# Patient Record
Sex: FEMALE | Race: WHITE | Employment: UNEMPLOYED | URBAN - METROPOLITAN AREA
[De-identification: names, ages, dates, MRNs, and addresses within clinical notes are randomized per-mention and may not be internally consistent; named-entity substitution may affect disease eponyms.]

---

## 2017-04-21 ENCOUNTER — HOSPITAL ENCOUNTER (EMERGENCY)
Age: 57
Discharge: HOME | End: 2017-04-21
Attending: EMERGENCY MEDICINE
Payer: COMMERCIAL

## 2017-04-21 ENCOUNTER — APPOINTMENT (OUTPATIENT)
Dept: CT IMAGING | Age: 57
End: 2017-04-21
Attending: EMERGENCY MEDICINE
Payer: COMMERCIAL

## 2017-04-21 VITALS
SYSTOLIC BLOOD PRESSURE: 111 MMHG | BODY MASS INDEX: 25.16 KG/M2 | TEMPERATURE: 98.3 F | HEIGHT: 63 IN | HEART RATE: 87 BPM | DIASTOLIC BLOOD PRESSURE: 76 MMHG | OXYGEN SATURATION: 96 % | WEIGHT: 142 LBS | RESPIRATION RATE: 16 BRPM

## 2017-04-21 DIAGNOSIS — N30.00 ACUTE CYSTITIS WITHOUT HEMATURIA: Primary | ICD-10-CM

## 2017-04-21 LAB
ALBUMIN SERPL BCP-MCNC: 4 G/DL (ref 3.5–5)
ALBUMIN/GLOB SERPL: 1.1 {RATIO} (ref 1.2–3.5)
ALP SERPL-CCNC: 118 U/L (ref 50–136)
ALT SERPL-CCNC: 22 U/L (ref 12–65)
ANION GAP BLD CALC-SCNC: 8 MMOL/L (ref 7–16)
AST SERPL W P-5'-P-CCNC: 21 U/L (ref 15–37)
BACTERIA URNS QL MICRO: ABNORMAL /HPF
BASOPHILS # BLD AUTO: 0 K/UL (ref 0–0.2)
BASOPHILS # BLD: 0 % (ref 0–2)
BILIRUB SERPL-MCNC: 0.4 MG/DL (ref 0.2–1.1)
BUN SERPL-MCNC: 14 MG/DL (ref 6–23)
CALCIUM SERPL-MCNC: 9.4 MG/DL (ref 8.3–10.4)
CASTS URNS QL MICRO: 0 /LPF
CHLORIDE SERPL-SCNC: 110 MMOL/L (ref 98–107)
CO2 SERPL-SCNC: 25 MMOL/L (ref 21–32)
CREAT SERPL-MCNC: 0.79 MG/DL (ref 0.6–1)
CRYSTALS URNS QL MICRO: 0 /LPF
DIFFERENTIAL METHOD BLD: ABNORMAL
EOSINOPHIL # BLD: 0.1 K/UL (ref 0–0.8)
EOSINOPHIL NFR BLD: 1 % (ref 0.5–7.8)
EPI CELLS #/AREA URNS HPF: ABNORMAL /HPF
ERYTHROCYTE [DISTWIDTH] IN BLOOD BY AUTOMATED COUNT: 15 % (ref 11.9–14.6)
GLOBULIN SER CALC-MCNC: 3.7 G/DL (ref 2.3–3.5)
GLUCOSE SERPL-MCNC: 118 MG/DL (ref 65–100)
HCT VFR BLD AUTO: 36.6 % (ref 35.8–46.3)
HGB BLD-MCNC: 11.7 G/DL (ref 11.7–15.4)
IMM GRANULOCYTES # BLD: 0 K/UL (ref 0–0.5)
IMM GRANULOCYTES NFR BLD AUTO: 0.2 % (ref 0–5)
LYMPHOCYTES # BLD AUTO: 6 % (ref 13–44)
LYMPHOCYTES # BLD: 0.6 K/UL (ref 0.5–4.6)
MCH RBC QN AUTO: 27.2 PG (ref 26.1–32.9)
MCHC RBC AUTO-ENTMCNC: 32 G/DL (ref 31.4–35)
MCV RBC AUTO: 85.1 FL (ref 79.6–97.8)
MONOCYTES # BLD: 0.2 K/UL (ref 0.1–1.3)
MONOCYTES NFR BLD AUTO: 2 % (ref 4–12)
MUCOUS THREADS URNS QL MICRO: 0 /LPF
NEUTS SEG # BLD: 8.7 K/UL (ref 1.7–8.2)
NEUTS SEG NFR BLD AUTO: 91 % (ref 43–78)
OTHER OBSERVATIONS,UCOM: ABNORMAL
PLATELET # BLD AUTO: 332 K/UL (ref 150–450)
PMV BLD AUTO: 9.3 FL (ref 10.8–14.1)
POTASSIUM SERPL-SCNC: 4.3 MMOL/L (ref 3.5–5.1)
PROT SERPL-MCNC: 7.7 G/DL (ref 6.3–8.2)
RBC # BLD AUTO: 4.3 M/UL (ref 4.05–5.25)
RBC #/AREA URNS HPF: ABNORMAL /HPF
SODIUM SERPL-SCNC: 143 MMOL/L (ref 136–145)
WBC # BLD AUTO: 9.6 K/UL (ref 4.3–11.1)
WBC URNS QL MICRO: ABNORMAL /HPF

## 2017-04-21 PROCEDURE — 85025 COMPLETE CBC W/AUTO DIFF WBC: CPT | Performed by: EMERGENCY MEDICINE

## 2017-04-21 PROCEDURE — 99284 EMERGENCY DEPT VISIT MOD MDM: CPT | Performed by: EMERGENCY MEDICINE

## 2017-04-21 PROCEDURE — 81015 MICROSCOPIC EXAM OF URINE: CPT | Performed by: EMERGENCY MEDICINE

## 2017-04-21 PROCEDURE — 80053 COMPREHEN METABOLIC PANEL: CPT | Performed by: EMERGENCY MEDICINE

## 2017-04-21 PROCEDURE — 81003 URINALYSIS AUTO W/O SCOPE: CPT | Performed by: EMERGENCY MEDICINE

## 2017-04-21 PROCEDURE — 70450 CT HEAD/BRAIN W/O DYE: CPT

## 2017-04-21 RX ORDER — CEFPODOXIME PROXETIL 100 MG/1
100 TABLET, FILM COATED ORAL 2 TIMES DAILY
Qty: 14 TAB | Refills: 0 | Status: SHIPPED | OUTPATIENT
Start: 2017-04-21 | End: 2017-04-28
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
## 2017-04-21 NOTE — DISCHARGE INSTRUCTIONS
Urinary Tract Infection in Women: Care Instructions  Your Care Instructions    A urinary tract infection, or UTI, is a general term for an infection anywhere between the kidneys and the urethra (where urine comes out). Most UTIs are bladder infections. They often cause pain or burning when you urinate. UTIs are caused by bacteria and can be cured with antibiotics. Be sure to complete your treatment so that the infection goes away. Follow-up care is a key part of your treatment and safety. Be sure to make and go to all appointments, and call your doctor if you are having problems. It's also a good idea to know your test results and keep a list of the medicines you take. How can you care for yourself at home? · Take your antibiotics as directed. Do not stop taking them just because you feel better. You need to take the full course of antibiotics. · Drink extra water and other fluids for the next day or two. This may help wash out the bacteria that are causing the infection. (If you have kidney, heart, or liver disease and have to limit fluids, talk with your doctor before you increase your fluid intake.)  · Avoid drinks that are carbonated or have caffeine. They can irritate the bladder. · Urinate often. Try to empty your bladder each time. · To relieve pain, take a hot bath or lay a heating pad set on low over your lower belly or genital area. Never go to sleep with a heating pad in place. To prevent UTIs  · Drink plenty of water each day. This helps you urinate often, which clears bacteria from your system. (If you have kidney, heart, or liver disease and have to limit fluids, talk with your doctor before you increase your fluid intake.)  · Urinate when you need to. · Urinate right after you have sex. · Change sanitary pads often. · Avoid douches, bubble baths, feminine hygiene sprays, and other feminine hygiene products that have deodorants.   · After going to the bathroom, wipe from front to back.  When should you call for help? Call your doctor now or seek immediate medical care if:  · Symptoms such as fever, chills, nausea, or vomiting get worse or appear for the first time. · You have new pain in your back just below your rib cage. This is called flank pain. · There is new blood or pus in your urine. · You have any problems with your antibiotic medicine. Watch closely for changes in your health, and be sure to contact your doctor if:  · You are not getting better after taking an antibiotic for 2 days. · Your symptoms go away but then come back. Where can you learn more? Go to http://arsalan-tyrell.info/. Enter V449 in the search box to learn more about \"Urinary Tract Infection in Women: Care Instructions. \"  Current as of: November 28, 2016  Content Version: 11.2  © 6274-4251 [employer]. Care instructions adapted under license by [employer] (which disclaims liability or warranty for this information). If you have questions about a medical condition or this instruction, always ask your healthcare professional. [place] [address] any warranty or liability for your use of this information. Urinary Tract Infection in Women: Care Instructions  Your Care Instructions    A urinary tract infection, or UTI, is a general term for an infection anywhere between the kidneys and the urethra (where urine comes out). Most UTIs are bladder infections. They often cause pain or burning when you urinate. UTIs are caused by bacteria and can be cured with antibiotics. Be sure to complete your treatment so that the infection goes away. Follow-up care is a key part of your treatment and safety. Be sure to make and go to all appointments, and call your doctor if you are having problems. It's also a good idea to know your test results and keep a list of the medicines you take. How can you care for yourself at home?   · Take your antibiotics as directed. Do not stop taking them just because you feel better. You need to take the full course of antibiotics. · Drink extra water and other fluids for the next day or two. This may help wash out the bacteria that are causing the infection. (If you have kidney, heart, or liver disease and have to limit fluids, talk with your doctor before you increase your fluid intake.)  · Avoid drinks that are carbonated or have caffeine. They can irritate the bladder. · Urinate often. Try to empty your bladder each time. · To relieve pain, take a hot bath or lay a heating pad set on low over your lower belly or genital area. Never go to sleep with a heating pad in place. To prevent UTIs  · Drink plenty of water each day. This helps you urinate often, which clears bacteria from your system. (If you have kidney, heart, or liver disease and have to limit fluids, talk with your doctor before you increase your fluid intake.)  · Urinate when you need to. · Urinate right after you have sex. · Change sanitary pads often. · Avoid douches, bubble baths, feminine hygiene sprays, and other feminine hygiene products that have deodorants. · After going to the bathroom, wipe from front to back. When should you call for help? Call your doctor now or seek immediate medical care if:  · Symptoms such as fever, chills, nausea, or vomiting get worse or appear for the first time. · You have new pain in your back just below your rib cage. This is called flank pain. · There is new blood or pus in your urine. · You have any problems with your antibiotic medicine. Watch closely for changes in your health, and be sure to contact your doctor if:  · You are not getting better after taking an antibiotic for 2 days. · Your symptoms go away but then come back. Where can you learn more? Go to http://arsalan-tyrell.info/.   Enter A830 in the search box to learn more about \"Urinary Tract Infection in Women: Care Instructions. \"  Current as of: November 28, 2016  Content Version: 11.2  © 2804-9702 [employer], [facility]. Care instructions adapted under license by [facility] (which disclaims liability or warranty for this information). If you have questions about a medical condition or this instruction, always ask your healthcare professional. [place] [address] any warranty or liability for your use of this information.

## 2017-04-21 NOTE — ED PROVIDER NOTES
HPI Comments: Here with some slight AMS at home but now is at her normal baseline. No known fever. No cough or sputum. No GI changes. some baseline depressive changes. No n-v-d. No med change. Daughter(hearing) states had episode in past like this when had UTI to her recall. Patient is a 55 y.o. female presenting with altered mental status. The history is provided by the patient and a relative (hearing and deaf daughters). A  was used (). Altered mental status    This is a new problem. The problem has been resolved. Associated symptoms include confusion. Pertinent negatives include no somnolence, no seizures, no unresponsiveness, no weakness, no agitation, no delusions and no hallucinations. Mental status baseline is normal.  Her past medical history does not include seizures, CVA, hypertension, depression, dementia or heart disease. Past Medical History:   Diagnosis Date    Anxiety     Asthma     controlled with inhalers    Deaf     Deafness since age 4    ASL-    Depression     Hypercholesterolemia     Hypertension     Hypothyroid     Stool color black     diarrhea    Vision problems        Past Surgical History:   Procedure Laterality Date    HX  SECTION      HX COLONOSCOPY      HX FRACTURE TX  5-6 yrs ago    right arm with pinning    HX ORTHOPAEDIC      R wrist with Hardware    HX ORTHOPAEDIC  14    R wrist surgery, she broke it and they put a metal plate    HX TUBAL LIGATION           Family History:   Problem Relation Age of Onset    Heart Disease Mother     Breast Cancer Mother 48    Heart Disease Father     Cancer Maternal Grandmother     Colon Cancer Neg Hx        Social History     Social History    Marital status: SINGLE     Spouse name: N/A    Number of children: N/A    Years of education: N/A     Occupational History    Not on file.      Social History Main Topics    Smoking status: Never Smoker    Smokeless tobacco: Never Used    Alcohol use No    Drug use: No    Sexual activity: No     Other Topics Concern    Not on file     Social History Narrative         ALLERGIES: Review of patient's allergies indicates no known allergies. Review of Systems   Constitutional: Negative for chills and fever. HENT: Negative. Respiratory: Negative. Cardiovascular: Negative. Gastrointestinal: Negative. Genitourinary: Negative. Neurological: Negative for seizures and weakness. Psychiatric/Behavioral: Positive for confusion. Negative for agitation and hallucinations. All other systems reviewed and are negative. Vitals:    04/21/17 1238   BP: 111/76   Pulse: 87   Resp: 16   Temp: 98.3 °F (36.8 °C)   SpO2: 96%   Weight: 64.4 kg (142 lb)   Height: 5' 3\" (1.6 m)            Physical Exam   Constitutional: She appears well-developed and well-nourished. No distress. Smiling and very pleasant   HENT:   Head: Atraumatic. Deaf     Eyes: EOM are normal. Pupils are equal, round, and reactive to light. No scleral icterus. Neck: Neck supple. Cardiovascular: Normal rate and intact distal pulses. Pulmonary/Chest: Effort normal. No respiratory distress. She has no wheezes. Abdominal: Soft. There is no tenderness. There is no rebound and no guarding. Musculoskeletal: Normal range of motion. Neurological: She is alert. No cranial nerve deficit. Skin: Skin is warm and dry. Psychiatric: Her behavior is normal. Thought content normal.   Nursing note and vitals reviewed. MDM  Number of Diagnoses or Management Options  Acute cystitis without hematuria:   Diagnosis management comments: AMS that is now resolved.  Has urine changes and will cover this (changed to Keflex due to cost)       Amount and/or Complexity of Data Reviewed  Clinical lab tests: reviewed and ordered  Tests in the radiology section of CPT®: reviewed  Obtain history from someone other than the patient: yes  Independent visualization of images, tracings, or specimens: yes    Risk of Complications, Morbidity, and/or Mortality  Presenting problems: high  Diagnostic procedures: low  Management options: moderate    Patient Progress  Patient progress: stable    ED Course       Procedures

## 2017-06-06 PROBLEM — E03.9 ACQUIRED HYPOTHYROIDISM: Chronic | Status: ACTIVE | Noted: 2017-06-06

## 2017-06-06 PROBLEM — I10 ESSENTIAL HYPERTENSION: Chronic | Status: ACTIVE | Noted: 2017-06-06

## 2017-06-10 ENCOUNTER — HOSPITAL ENCOUNTER (OUTPATIENT)
Dept: MAMMOGRAPHY | Age: 57
Discharge: HOME | End: 2017-06-10
Attending: FAMILY MEDICINE
Payer: COMMERCIAL

## 2017-06-10 DIAGNOSIS — Z12.31 SCREENING MAMMOGRAM, ENCOUNTER FOR: ICD-10-CM

## 2017-06-10 PROCEDURE — 77067 SCR MAMMO BI INCL CAD: CPT

## 2017-07-14 PROBLEM — N39.46 MIXED INCONTINENCE URGE AND STRESS: Status: ACTIVE | Noted: 2017-07-14

## 2017-07-14 PROBLEM — N95.2 ATROPHIC VAGINITIS: Status: ACTIVE | Noted: 2017-07-14

## 2017-07-18 PROBLEM — N39.0 URINARY TRACT INFECTION WITHOUT HEMATURIA: Status: ACTIVE | Noted: 2017-07-18

## 2018-09-19 ENCOUNTER — HOSPITAL ENCOUNTER (OUTPATIENT)
Dept: LAB | Age: 58
Discharge: HOME | End: 2018-09-19

## 2018-09-19 LAB
ANION GAP SERPL CALC-SCNC: 6 MMOL/L (ref 7–16)
APPEARANCE UR: ABNORMAL
BACTERIA URNS QL MICRO: ABNORMAL /HPF
BILIRUB UR QL: NEGATIVE
BUN SERPL-MCNC: 18 MG/DL (ref 6–23)
CALCIUM SERPL-MCNC: 8.6 MG/DL (ref 8.3–10.4)
CASTS URNS QL MICRO: ABNORMAL /LPF
CHLORIDE SERPL-SCNC: 105 MMOL/L (ref 98–107)
CO2 SERPL-SCNC: 27 MMOL/L (ref 21–32)
COLOR UR: YELLOW
CREAT SERPL-MCNC: 0.85 MG/DL (ref 0.6–1)
EPI CELLS #/AREA URNS HPF: 0 /HPF
ERYTHROCYTE [DISTWIDTH] IN BLOOD BY AUTOMATED COUNT: 14.4 %
GLUCOSE SERPL-MCNC: 107 MG/DL (ref 65–100)
GLUCOSE UR STRIP.AUTO-MCNC: NEGATIVE MG/DL
HCT VFR BLD AUTO: 31.5 % (ref 35.8–46.3)
HGB BLD-MCNC: 9.9 G/DL (ref 11.7–15.4)
HGB UR QL STRIP: ABNORMAL
KETONES UR QL STRIP.AUTO: NEGATIVE MG/DL
LEUKOCYTE ESTERASE UR QL STRIP.AUTO: ABNORMAL
MCH RBC QN AUTO: 29.3 PG (ref 26.1–32.9)
MCHC RBC AUTO-ENTMCNC: 31.4 G/DL (ref 31.4–35)
MCV RBC AUTO: 93.2 FL (ref 79.6–97.8)
NITRITE UR QL STRIP.AUTO: NEGATIVE
NRBC # BLD: 0 K/UL (ref 0–0.2)
PH UR STRIP: 7.5 [PH] (ref 5–9)
PLATELET # BLD AUTO: 192 K/UL (ref 150–450)
PMV BLD AUTO: 9.6 FL (ref 9.4–12.3)
POTASSIUM SERPL-SCNC: 4.1 MMOL/L (ref 3.5–5.1)
PROT UR STRIP-MCNC: 30 MG/DL
RBC # BLD AUTO: 3.38 M/UL (ref 4.05–5.2)
RBC #/AREA URNS HPF: ABNORMAL /HPF
SODIUM SERPL-SCNC: 138 MMOL/L (ref 136–145)
SP GR UR REFRACTOMETRY: 1.02 (ref 1–1.02)
UROBILINOGEN UR QL STRIP.AUTO: 1 EU/DL (ref 0.2–1)
WBC # BLD AUTO: 9.3 K/UL (ref 4.3–11.1)
WBC URNS QL MICRO: >100 /HPF

## 2018-09-19 PROCEDURE — 81001 URINALYSIS AUTO W/SCOPE: CPT

## 2018-09-19 PROCEDURE — 85027 COMPLETE CBC AUTOMATED: CPT

## 2018-09-19 PROCEDURE — 80048 BASIC METABOLIC PNL TOTAL CA: CPT

## 2018-11-10 ENCOUNTER — HOSPITAL ENCOUNTER (OUTPATIENT)
Dept: LAB | Age: 58
Discharge: HOME | End: 2018-11-10

## 2018-11-10 LAB
APPEARANCE UR: CLEAR
BACTERIA URNS QL MICRO: ABNORMAL /HPF
BILIRUB UR QL: NEGATIVE
COLOR UR: YELLOW
EPI CELLS #/AREA URNS HPF: ABNORMAL /HPF
GLUCOSE UR STRIP.AUTO-MCNC: NEGATIVE MG/DL
HGB UR QL STRIP: NEGATIVE
KETONES UR QL STRIP.AUTO: NEGATIVE MG/DL
LEUKOCYTE ESTERASE UR QL STRIP.AUTO: ABNORMAL
NITRITE UR QL STRIP.AUTO: NEGATIVE
PH UR STRIP: 8 [PH] (ref 5–9)
PROT UR STRIP-MCNC: NEGATIVE MG/DL
RBC #/AREA URNS HPF: ABNORMAL /HPF
SP GR UR REFRACTOMETRY: 1.01 (ref 1–1.02)
UROBILINOGEN UR QL STRIP.AUTO: 1 EU/DL (ref 0.2–1)
WBC URNS QL MICRO: ABNORMAL /HPF

## 2018-11-10 PROCEDURE — 81001 URINALYSIS AUTO W/SCOPE: CPT

## 2018-11-10 PROCEDURE — 87086 URINE CULTURE/COLONY COUNT: CPT

## 2018-11-12 LAB
BACTERIA SPEC CULT: NORMAL
SERVICE CMNT-IMP: NORMAL

## 2019-02-27 PROCEDURE — 80048 BASIC METABOLIC PNL TOTAL CA: CPT

## 2019-02-27 PROCEDURE — 81001 URINALYSIS AUTO W/SCOPE: CPT

## 2019-02-27 PROCEDURE — 85027 COMPLETE CBC AUTOMATED: CPT

## 2019-02-28 ENCOUNTER — HOSPITAL ENCOUNTER (OUTPATIENT)
Dept: LAB | Age: 59
Discharge: HOME | End: 2019-02-28

## 2019-02-28 LAB
ANION GAP SERPL CALC-SCNC: 5 MMOL/L (ref 7–16)
APPEARANCE UR: ABNORMAL
BACTERIA URNS QL MICRO: ABNORMAL /HPF
BILIRUB UR QL: NEGATIVE
BUN SERPL-MCNC: 15 MG/DL (ref 6–23)
CALCIUM SERPL-MCNC: 8.6 MG/DL (ref 8.3–10.4)
CASTS URNS QL MICRO: ABNORMAL /LPF
CHLORIDE SERPL-SCNC: 106 MMOL/L (ref 98–107)
CO2 SERPL-SCNC: 29 MMOL/L (ref 21–32)
COLOR UR: ABNORMAL
CREAT SERPL-MCNC: 0.74 MG/DL (ref 0.6–1)
EPI CELLS #/AREA URNS HPF: 0 /HPF
ERYTHROCYTE [DISTWIDTH] IN BLOOD BY AUTOMATED COUNT: 13.3 % (ref 11.9–14.6)
GLUCOSE SERPL-MCNC: 89 MG/DL (ref 65–100)
GLUCOSE UR STRIP.AUTO-MCNC: NEGATIVE MG/DL
HCT VFR BLD AUTO: 34.9 % (ref 35.8–46.3)
HGB BLD-MCNC: 11 G/DL (ref 11.7–15.4)
HGB UR QL STRIP: NEGATIVE
KETONES UR QL STRIP.AUTO: ABNORMAL MG/DL
LEUKOCYTE ESTERASE UR QL STRIP.AUTO: NEGATIVE
MCH RBC QN AUTO: 29.5 PG (ref 26.1–32.9)
MCHC RBC AUTO-ENTMCNC: 31.5 G/DL (ref 31.4–35)
MCV RBC AUTO: 93.6 FL (ref 79.6–97.8)
NITRITE UR QL STRIP.AUTO: POSITIVE
NRBC # BLD: 0 K/UL (ref 0–0.2)
PH UR STRIP: 6.5 [PH] (ref 5–9)
PLATELET # BLD AUTO: 223 K/UL (ref 150–450)
PMV BLD AUTO: 9.9 FL (ref 9.4–12.3)
POTASSIUM SERPL-SCNC: 4.1 MMOL/L (ref 3.5–5.1)
PROT UR STRIP-MCNC: ABNORMAL MG/DL
RBC # BLD AUTO: 3.73 M/UL (ref 4.05–5.2)
RBC #/AREA URNS HPF: ABNORMAL /HPF
SODIUM SERPL-SCNC: 140 MMOL/L (ref 136–145)
SP GR UR REFRACTOMETRY: 1.02 (ref 1–1.02)
UROBILINOGEN UR QL STRIP.AUTO: 0.2 EU/DL (ref 0.2–1)
WBC # BLD AUTO: 8.8 K/UL (ref 4.3–11.1)
WBC URNS QL MICRO: ABNORMAL /HPF

## 2019-03-14 ENCOUNTER — HOSPITAL ENCOUNTER (OUTPATIENT)
Dept: LAB | Age: 59
Discharge: HOME | End: 2019-03-14

## 2019-03-14 LAB
APPEARANCE UR: CLEAR
BILIRUB UR QL: NEGATIVE
COLOR UR: YELLOW
GLUCOSE UR STRIP.AUTO-MCNC: NEGATIVE MG/DL
HGB UR QL STRIP: NEGATIVE
KETONES UR QL STRIP.AUTO: NEGATIVE MG/DL
LEUKOCYTE ESTERASE UR QL STRIP.AUTO: NEGATIVE
NITRITE UR QL STRIP.AUTO: NEGATIVE
PH UR STRIP: 6 [PH] (ref 5–9)
PROT UR STRIP-MCNC: NEGATIVE MG/DL
SP GR UR REFRACTOMETRY: 1.01 (ref 1–1.02)
UROBILINOGEN UR QL STRIP.AUTO: 0.2 EU/DL (ref 0.2–1)

## 2019-03-14 PROCEDURE — 81003 URINALYSIS AUTO W/O SCOPE: CPT

## 2019-12-17 ENCOUNTER — HOSPITAL ENCOUNTER (EMERGENCY)
Age: 59
Discharge: HOME | End: 2019-12-17
Attending: EMERGENCY MEDICINE
Payer: MEDICAID

## 2019-12-17 VITALS
TEMPERATURE: 98.4 F | OXYGEN SATURATION: 97 % | HEART RATE: 78 BPM | SYSTOLIC BLOOD PRESSURE: 128 MMHG | RESPIRATION RATE: 18 BRPM | DIASTOLIC BLOOD PRESSURE: 74 MMHG

## 2019-12-17 DIAGNOSIS — N39.0 URINARY TRACT INFECTION WITHOUT HEMATURIA, SITE UNSPECIFIED: ICD-10-CM

## 2019-12-17 DIAGNOSIS — K92.2 GASTROINTESTINAL HEMORRHAGE, UNSPECIFIED GASTROINTESTINAL HEMORRHAGE TYPE: Primary | ICD-10-CM

## 2019-12-17 LAB
ALBUMIN SERPL-MCNC: 3.7 G/DL (ref 3.5–5)
ALBUMIN/GLOB SERPL: 1.2 {RATIO} (ref 1.2–3.5)
ALP SERPL-CCNC: 96 U/L (ref 50–136)
ALT SERPL-CCNC: 26 U/L (ref 12–65)
AMORPH CRY URNS QL MICRO: ABNORMAL
ANION GAP SERPL CALC-SCNC: 3 MMOL/L (ref 7–16)
APPEARANCE UR: ABNORMAL
AST SERPL-CCNC: 21 U/L (ref 15–37)
BACTERIA URNS QL MICRO: ABNORMAL /HPF
BASOPHILS # BLD: 0.1 K/UL (ref 0–0.2)
BASOPHILS NFR BLD: 1 % (ref 0–2)
BILIRUB SERPL-MCNC: 0.2 MG/DL (ref 0.2–1.1)
BILIRUB UR QL: NEGATIVE
BUN SERPL-MCNC: 18 MG/DL (ref 6–23)
CALCIUM SERPL-MCNC: 9.2 MG/DL (ref 8.3–10.4)
CHLORIDE SERPL-SCNC: 112 MMOL/L (ref 98–107)
CO2 SERPL-SCNC: 30 MMOL/L (ref 21–32)
COLOR UR: YELLOW
CREAT SERPL-MCNC: 0.76 MG/DL (ref 0.6–1)
DIFFERENTIAL METHOD BLD: ABNORMAL
EOSINOPHIL # BLD: 0.2 K/UL (ref 0–0.8)
EOSINOPHIL NFR BLD: 2 % (ref 0.5–7.8)
EPI CELLS #/AREA URNS HPF: ABNORMAL /HPF
ERYTHROCYTE [DISTWIDTH] IN BLOOD BY AUTOMATED COUNT: 14.3 % (ref 11.9–14.6)
GLOBULIN SER CALC-MCNC: 3.1 G/DL (ref 2.3–3.5)
GLUCOSE SERPL-MCNC: 95 MG/DL (ref 65–100)
GLUCOSE UR STRIP.AUTO-MCNC: NEGATIVE MG/DL
HCT VFR BLD AUTO: 40.1 % (ref 35.8–46.3)
HEMOCCULT STL QL: POSITIVE
HGB BLD-MCNC: 12.9 G/DL (ref 11.7–15.4)
HGB UR QL STRIP: NEGATIVE
IMM GRANULOCYTES # BLD AUTO: 0 K/UL (ref 0–0.5)
IMM GRANULOCYTES NFR BLD AUTO: 0 % (ref 0–5)
KETONES UR QL STRIP.AUTO: NEGATIVE MG/DL
LEUKOCYTE ESTERASE UR QL STRIP.AUTO: ABNORMAL
LYMPHOCYTES # BLD: 2.1 K/UL (ref 0.5–4.6)
LYMPHOCYTES NFR BLD: 26 % (ref 13–44)
MCH RBC QN AUTO: 28.5 PG (ref 26.1–32.9)
MCHC RBC AUTO-ENTMCNC: 32.2 G/DL (ref 31.4–35)
MCV RBC AUTO: 88.5 FL (ref 79.6–97.8)
MONOCYTES # BLD: 0.6 K/UL (ref 0.1–1.3)
MONOCYTES NFR BLD: 7 % (ref 4–12)
NEUTS SEG # BLD: 5.2 K/UL (ref 1.7–8.2)
NEUTS SEG NFR BLD: 64 % (ref 43–78)
NITRITE UR QL STRIP.AUTO: NEGATIVE
NRBC # BLD: 0 K/UL (ref 0–0.2)
OTHER OBSERVATIONS,UCOM: ABNORMAL
PH UR STRIP: 7 [PH] (ref 5–9)
PLATELET # BLD AUTO: 323 K/UL (ref 150–450)
PMV BLD AUTO: 9 FL (ref 9.4–12.3)
POTASSIUM SERPL-SCNC: 4.8 MMOL/L (ref 3.5–5.1)
PROT SERPL-MCNC: 6.8 G/DL (ref 6.3–8.2)
PROT UR STRIP-MCNC: NEGATIVE MG/DL
RBC # BLD AUTO: 4.53 M/UL (ref 4.05–5.2)
SODIUM SERPL-SCNC: 145 MMOL/L (ref 136–145)
SP GR UR REFRACTOMETRY: 1.02 (ref 1–1.02)
UROBILINOGEN UR QL STRIP.AUTO: 1 EU/DL (ref 0.2–1)
WBC # BLD AUTO: 8.1 K/UL (ref 4.3–11.1)
WBC URNS QL MICRO: ABNORMAL /HPF

## 2019-12-17 PROCEDURE — 85025 COMPLETE CBC W/AUTO DIFF WBC: CPT

## 2019-12-17 PROCEDURE — 80053 COMPREHEN METABOLIC PANEL: CPT

## 2019-12-17 PROCEDURE — 74011000258 HC RX REV CODE- 258: Performed by: EMERGENCY MEDICINE

## 2019-12-17 PROCEDURE — 81003 URINALYSIS AUTO W/O SCOPE: CPT | Performed by: EMERGENCY MEDICINE

## 2019-12-17 PROCEDURE — 51701 INSERT BLADDER CATHETER: CPT | Performed by: EMERGENCY MEDICINE

## 2019-12-17 PROCEDURE — 99285 EMERGENCY DEPT VISIT HI MDM: CPT | Performed by: EMERGENCY MEDICINE

## 2019-12-17 PROCEDURE — 82270 OCCULT BLOOD FECES: CPT

## 2019-12-17 PROCEDURE — 77030011943

## 2019-12-17 PROCEDURE — 81001 URINALYSIS AUTO W/SCOPE: CPT

## 2019-12-17 PROCEDURE — 96375 TX/PRO/DX INJ NEW DRUG ADDON: CPT | Performed by: EMERGENCY MEDICINE

## 2019-12-17 PROCEDURE — 74011250636 HC RX REV CODE- 250/636: Performed by: EMERGENCY MEDICINE

## 2019-12-17 PROCEDURE — 96365 THER/PROPH/DIAG IV INF INIT: CPT | Performed by: EMERGENCY MEDICINE

## 2019-12-17 RX ORDER — LORAZEPAM 2 MG/ML
1 INJECTION INTRAMUSCULAR
Status: COMPLETED | OUTPATIENT
Start: 2019-12-17 | End: 2019-12-17

## 2019-12-17 RX ORDER — CEPHALEXIN 500 MG/1
500 CAPSULE ORAL 4 TIMES DAILY
Qty: 28 CAP | Refills: 0 | Status: SHIPPED | OUTPATIENT
Start: 2019-12-17 | End: 2019-12-24

## 2019-12-17 RX ADMIN — LORAZEPAM 1 MG: 2 INJECTION INTRAMUSCULAR; INTRAVENOUS at 16:07

## 2019-12-17 RX ADMIN — CEFTRIAXONE 1 G: 1 INJECTION, POWDER, FOR SOLUTION INTRAMUSCULAR; INTRAVENOUS at 16:42

## 2019-12-17 NOTE — DISCHARGE INSTRUCTIONS
Complete the current course of antibiotics as prescribed. Continue with your other medications and care. Return to the emergency department for any new or acute concerns.

## 2019-12-17 NOTE — ED PROVIDER NOTES
Patient is a 61-year-old female with a history of dementia and who is deaf. She is sent to the emergency department today via EMS from her nursing home for further evaluation of rectal bleeding. The history is provided by the EMS personnel and the nursing home. The history is limited by the absence of a caregiver. Rectal Bleeding This is a new problem. Past Medical History:  
Diagnosis Date  Anxiety  Asthma   
 controlled with inhalers  Deaf  Deafness since age 4 ASL-  
 Depression  Hypercholesterolemia  Hypertension  Hypothyroid  Stool color black   
 diarrhea  Vision problems Past Surgical History:  
Procedure Laterality Date  HX  SECTION    
 HX COLONOSCOPY    
 HX FRACTURE TX  5-6 yrs ago  
 right arm with pinning  HX ORTHOPAEDIC   R wrist with Hardware  HX ORTHOPAEDIC  14 R wrist surgery, she broke it and they put a metal plate [id number] [employer] Family History:  
Problem Relation Age of Onset  Heart Disease Mother  Breast Cancer Mother 48  
 Heart Disease Father  Cancer Maternal Grandmother  No Known Problems Maternal Grandfather  No Known Problems Paternal Grandmother  No Known Problems Paternal Grandfather  Colon Cancer Neg Hx Social History Socioeconomic History  Marital status: SINGLE Spouse name: Not on file  Number of children: Not on file  Years of education: Not on file  Highest education level: Not on file Occupational History  Not on file Social Needs  Financial resource strain: Not on file  Food insecurity:  
  Worry: Not on file Inability: Not on file  Transportation needs:  
  Medical: Not on file Non-medical: Not on file Tobacco Use  Smoking status: Never Smoker  Smokeless tobacco: Never Used Substance and Sexual Activity  Alcohol use: No  
  Alcohol/week: 0.0 standard drinks  Drug use:  No  
  Sexual activity: Never Lifestyle  Physical activity:  
  Days per week: Not on file Minutes per session: Not on file  Stress: Not on file Relationships  Social connections:  
  Talks on phone: Not on file Gets together: Not on file Attends Zoroastrian service: Not on file Active member of club or organization: Not on file Attends meetings of clubs or organizations: Not on file Relationship status: Not on file  Intimate partner violence:  
  Fear of current or ex partner: Not on file Emotionally abused: Not on file Physically abused: Not on file Forced sexual activity: Not on file Other Topics Concern  Not on file Social History Narrative  Not on file ALLERGIES: Patient has no known allergies. Review of Systems Unable to perform ROS: Patient nonverbal  
Gastrointestinal: Positive for anal bleeding. Vitals:  
 12/17/19 1335 12/17/19 1345 BP: 134/89 134/89 Pulse: 84 Resp: 22 Temp: 98.6 °F (37 °C) SpO2: 96% Physical Exam 
Vitals signs and nursing note reviewed. Constitutional:   
   Appearance: She is well-developed. Comments: Thin HENT:  
   Head: Normocephalic and atraumatic. Eyes:  
   Conjunctiva/sclera: Conjunctivae normal.  
   Pupils: Pupils are equal, round, and reactive to light. Neck: Musculoskeletal: Normal range of motion and neck supple. Cardiovascular:  
   Rate and Rhythm: Normal rate and regular rhythm. Pulmonary:  
   Effort: Pulmonary effort is normal.  
   Breath sounds: Normal breath sounds. Abdominal:  
   Palpations: Abdomen is soft. Tenderness: There is no tenderness. There is no guarding or rebound. Genitourinary: 
   Comments: Maroon stool which is Hemoccult positive Musculoskeletal: Normal range of motion. General: No deformity. Lymphadenopathy:  
   Cervical: No cervical adenopathy. Skin: 
   General: Skin is warm and dry. Coloration: Skin is pale. Findings: No rash. Neurological:  
   General: No focal deficit present. Mental Status: She is alert. GCS: GCS eye subscore is 4. GCS verbal subscore is 5. GCS motor subscore is 6. Comments: Writhing around on the bed. Deaf. Does not communicate. Moving all extremities without focal deficit but unable to perform appropriate examination. MDM Number of Diagnoses or Management Options Diagnosis management comments: 3:12 PM 
Hemoglobin is 12 which is actually higher than her previous values. Blood pressure and vital signs have remained normal.  Records were reviewed and she was admitted to [facility] several months ago for similar episode of bleeding and was observed overnight and discharged to home when it stopped. Family members arrived later and report to me that they were called from the facility last night and told that she had some bleeding. I talked to them about admission and colonoscopy and since her blood counts are normal they would prefer she not be admitted and they just follow her blood count. They state that she does have some increased agitation today and that often happens with UTIs which she gets frequently and they would like me to check for that. 4:37 PM 
Mild UTI on cath specimen. Will give a gram of Rocephin and discharged back to her facility on Keflex. I discussed all these results with the patient's daughter and her friend who are at the bedside. They are communicating with the daughter via sign language. Again they do not want her admitted to the hospital regarding the bleeding. They want her to continue to be watched. Voice dictation software was used during the making of this note. This software is not perfect and grammatical and other typographical errors may be present. This note has been proofread, but may still contain errors.  
Gerald Rangel MD; 12/17/2019 @4:37 PM  
 =================================================================== Amount and/or Complexity of Data Reviewed Clinical lab tests: ordered and reviewed Obtain history from someone other than the patient: yes Review and summarize past medical records: yes Independent visualization of images, tracings, or specimens: yes Risk of Complications, Morbidity, and/or Mortality Presenting problems: low Diagnostic procedures: low Management options: low Patient Progress Patient progress: stable Procedures

## 2019-12-17 NOTE — ED NOTES
I have reviewed discharge instructions with the RN. The RN verbalized understanding. Patient left ED via Discharge Method: stretcher to rehab with medics Opportunity for questions and clarification provided. Patient given 1 scripts. To continue your aftercare when you leave the hospital, you may receive an automated call from our care team to check in on how you are doing. This is a free service and part of our promise to provide the best care and service to meet your aftercare needs.  If you have questions, or wish to unsubscribe from this service please call [phone]. Thank you for Choosing our [facility] Emergency Department.

## 2019-12-17 NOTE — ED TRIAGE NOTES
Patient arrives via EMS from [facility]. Called out for rectal bleeding; no hx of this issue. Patient is deaf and has hx of dementia. BP 92/76, HR 90, RR 18, SpO2 98%, . EMS states patient was more lethargic on scene and patient became more alert when BP up to 740C systolic. Per staff, patient becomes combative and this is normal for her.

## 2021-01-01 ENCOUNTER — HOSPICE ADMISSION (OUTPATIENT)
Dept: HOSPICE | Facility: HOSPICE | Age: 61
End: 2021-01-01

## 2021-01-01 ENCOUNTER — HOSPICE ADMISSION (OUTPATIENT)
Dept: HOSPICE | Facility: HOSPICE | Age: 61
End: 2021-01-01
Payer: MEDICAID

## 2021-01-01 ENCOUNTER — HOSPITAL ENCOUNTER (INPATIENT)
Age: 61
LOS: 15 days | End: 2021-12-22
Attending: INTERNAL MEDICINE | Admitting: INTERNAL MEDICINE

## 2021-01-01 VITALS
HEIGHT: 62 IN | RESPIRATION RATE: 22 BRPM | BODY MASS INDEX: 15.64 KG/M2 | WEIGHT: 85 LBS | TEMPERATURE: 97.4 F | HEART RATE: 114 BPM | SYSTOLIC BLOOD PRESSURE: 89 MMHG | DIASTOLIC BLOOD PRESSURE: 60 MMHG

## 2021-01-01 DIAGNOSIS — E43 SEVERE PROTEIN-CALORIE MALNUTRITION (HCC): ICD-10-CM

## 2021-01-01 DIAGNOSIS — F02.80: ICD-10-CM

## 2021-01-01 DIAGNOSIS — J69.0 ASPIRATION PNEUMONIA OF LEFT LOWER LOBE DUE TO GASTRIC SECRETIONS (HCC): ICD-10-CM

## 2021-01-01 DIAGNOSIS — F41.9 ANXIETY: Chronic | ICD-10-CM

## 2021-01-01 DIAGNOSIS — H91.93 BILATERAL DEAFNESS: Chronic | ICD-10-CM

## 2021-01-01 DIAGNOSIS — E86.0 DEHYDRATION, MODERATE: ICD-10-CM

## 2021-01-01 DIAGNOSIS — G93.41 METABOLIC ENCEPHALOPATHY: ICD-10-CM

## 2021-01-01 DIAGNOSIS — F20.5: ICD-10-CM

## 2021-01-01 DIAGNOSIS — F02.818 DEMENTIA ASSOCIATED WITH OTHER UNDERLYING DISEASE WITH BEHAVIORAL DISTURBANCE: ICD-10-CM

## 2021-01-01 PROCEDURE — 74011000250 HC RX REV CODE- 250: Performed by: INTERNAL MEDICINE

## 2021-01-01 PROCEDURE — 74011250636 HC RX REV CODE- 250/636: Performed by: NURSE PRACTITIONER

## 2021-01-01 PROCEDURE — 74011250637 HC RX REV CODE- 250/637: Performed by: NURSE PRACTITIONER

## 2021-01-01 PROCEDURE — 74011250637 HC RX REV CODE- 250/637: Performed by: INTERNAL MEDICINE

## 2021-01-01 PROCEDURE — G0299 HHS/HOSPICE OF RN EA 15 MIN: HCPCS

## 2021-01-01 PROCEDURE — 0651 HSPC ROUTINE HOME CARE

## 2021-01-01 PROCEDURE — 74011000250 HC RX REV CODE- 250: Performed by: NURSE PRACTITIONER

## 2021-01-01 PROCEDURE — 0656 HSPC GENERAL INPATIENT

## 2021-01-01 PROCEDURE — 74011250636 HC RX REV CODE- 250/636: Performed by: INTERNAL MEDICINE

## 2021-01-01 PROCEDURE — 3336500001 HSPC ELECTION

## 2021-01-01 PROCEDURE — 3331090004 HSPC SERVICE INTENSITY ADD-ON

## 2021-01-01 RX ORDER — HALOPERIDOL 5 MG/ML
4 INJECTION INTRAMUSCULAR
Status: DISCONTINUED | OUTPATIENT
Start: 2021-01-01 | End: 2021-01-01

## 2021-01-01 RX ORDER — HALOPERIDOL 5 MG/ML
2 INJECTION INTRAMUSCULAR
Status: DISCONTINUED | OUTPATIENT
Start: 2021-01-01 | End: 2021-01-01

## 2021-01-01 RX ORDER — GLYCOPYRROLATE 0.2 MG/ML
0.2 INJECTION INTRAMUSCULAR; INTRAVENOUS
Status: DISCONTINUED | OUTPATIENT
Start: 2021-01-01 | End: 2021-01-01 | Stop reason: HOSPADM

## 2021-01-01 RX ORDER — HALOPERIDOL 5 MG/ML
5 INJECTION INTRAMUSCULAR EVERY 6 HOURS
Status: DISCONTINUED | OUTPATIENT
Start: 2021-01-01 | End: 2021-01-01

## 2021-01-01 RX ORDER — HALOPERIDOL 5 MG/ML
5 INJECTION INTRAMUSCULAR
Status: DISCONTINUED | OUTPATIENT
Start: 2021-01-01 | End: 2021-01-01 | Stop reason: HOSPADM

## 2021-01-01 RX ORDER — GLYCOPYRROLATE 0.2 MG/ML
0.2 INJECTION INTRAMUSCULAR; INTRAVENOUS
Status: DISCONTINUED | OUTPATIENT
Start: 2021-01-01 | End: 2021-01-01

## 2021-01-01 RX ORDER — MORPHINE SULFATE 2 MG/ML
2 INJECTION, SOLUTION INTRAMUSCULAR; INTRAVENOUS
Status: DISCONTINUED | OUTPATIENT
Start: 2021-01-01 | End: 2021-01-01 | Stop reason: HOSPADM

## 2021-01-01 RX ORDER — BENZTROPINE MESYLATE 2 MG/1
1 TABLET ORAL
COMMUNITY

## 2021-01-01 RX ORDER — LORAZEPAM 2 MG/ML
1 INJECTION INTRAMUSCULAR
Status: DISCONTINUED | OUTPATIENT
Start: 2021-01-01 | End: 2021-01-01 | Stop reason: HOSPADM

## 2021-01-01 RX ORDER — OLANZAPINE 5 MG/1
5 TABLET, ORALLY DISINTEGRATING ORAL EVERY 12 HOURS
Status: DISCONTINUED | OUTPATIENT
Start: 2021-01-01 | End: 2021-01-01

## 2021-01-01 RX ORDER — HALOPERIDOL 5 MG/ML
4 INJECTION INTRAMUSCULAR EVERY 8 HOURS
Status: DISCONTINUED | OUTPATIENT
Start: 2021-01-01 | End: 2021-01-01

## 2021-01-01 RX ORDER — SODIUM CHLORIDE 0.9 % (FLUSH) 0.9 %
3 SYRINGE (ML) INJECTION EVERY 12 HOURS
Status: DISCONTINUED | OUTPATIENT
Start: 2021-01-01 | End: 2021-01-01

## 2021-01-01 RX ORDER — HALOPERIDOL 5 MG/ML
2 INJECTION INTRAMUSCULAR
Status: COMPLETED | OUTPATIENT
Start: 2021-01-01 | End: 2021-01-01

## 2021-01-01 RX ORDER — MIRTAZAPINE 15 MG/1
15 TABLET, FILM COATED ORAL
COMMUNITY

## 2021-01-01 RX ORDER — ONDANSETRON 4 MG/1
4 TABLET, FILM COATED ORAL
COMMUNITY

## 2021-01-01 RX ORDER — HALOPERIDOL 5 MG/1
5 TABLET ORAL 3 TIMES DAILY
Status: DISCONTINUED | OUTPATIENT
Start: 2021-01-01 | End: 2021-01-01

## 2021-01-01 RX ORDER — LORAZEPAM 1 MG/1
1 TABLET ORAL EVERY 6 HOURS
Status: DISCONTINUED | OUTPATIENT
Start: 2021-01-01 | End: 2021-01-01

## 2021-01-01 RX ORDER — CLONAZEPAM 0.5 MG/1
0.5 TABLET ORAL 3 TIMES DAILY
COMMUNITY

## 2021-01-01 RX ORDER — LORAZEPAM 2 MG/ML
0.5 INJECTION INTRAMUSCULAR EVERY 6 HOURS
Status: DISCONTINUED | OUTPATIENT
Start: 2021-01-01 | End: 2021-01-01

## 2021-01-01 RX ORDER — SENNOSIDES 8.6 MG/1
2 TABLET ORAL
COMMUNITY

## 2021-01-01 RX ORDER — POLYETHYLENE GLYCOL 3350 17 G/17G
17 POWDER, FOR SOLUTION ORAL
COMMUNITY

## 2021-01-01 RX ORDER — FACIAL-BODY WIPES
10 EACH TOPICAL AS NEEDED
Status: DISCONTINUED | OUTPATIENT
Start: 2021-01-01 | End: 2021-01-01 | Stop reason: HOSPADM

## 2021-01-01 RX ORDER — LORAZEPAM 2 MG/ML
1 INJECTION INTRAMUSCULAR EVERY 6 HOURS
Status: DISCONTINUED | OUTPATIENT
Start: 2021-01-01 | End: 2021-01-01

## 2021-01-01 RX ORDER — FENTANYL 12.5 UG/1
1 PATCH TRANSDERMAL
Status: DISCONTINUED | OUTPATIENT
Start: 2021-01-01 | End: 2021-01-01 | Stop reason: HOSPADM

## 2021-01-01 RX ORDER — LORAZEPAM 2 MG/ML
1 INJECTION INTRAMUSCULAR EVERY 8 HOURS
Status: DISCONTINUED | OUTPATIENT
Start: 2021-01-01 | End: 2021-01-01

## 2021-01-01 RX ORDER — ACETAMINOPHEN 650 MG/1
650 SUPPOSITORY RECTAL
Status: DISCONTINUED | OUTPATIENT
Start: 2021-01-01 | End: 2021-01-01 | Stop reason: HOSPADM

## 2021-01-01 RX ORDER — CHOLECALCIFEROL (VITAMIN D3) 125 MCG
5 CAPSULE ORAL
COMMUNITY

## 2021-01-01 RX ORDER — HALOPERIDOL 2 MG/ML
5 SOLUTION ORAL 3 TIMES DAILY
Status: DISCONTINUED | OUTPATIENT
Start: 2021-01-01 | End: 2021-01-01

## 2021-01-01 RX ORDER — GUAIFENESIN 100 MG/5ML
200 SOLUTION ORAL
COMMUNITY

## 2021-01-01 RX ORDER — OLANZAPINE 5 MG/1
10 TABLET, ORALLY DISINTEGRATING ORAL ONCE
Status: COMPLETED | OUTPATIENT
Start: 2021-01-01 | End: 2021-01-01

## 2021-01-01 RX ORDER — SODIUM CHLORIDE 0.9 % (FLUSH) 0.9 %
3 SYRINGE (ML) INJECTION 2 TIMES DAILY
Status: DISCONTINUED | OUTPATIENT
Start: 2021-01-01 | End: 2021-01-01

## 2021-01-01 RX ORDER — POLYVINYL ALCOHOL 14 MG/ML
1 SOLUTION/ DROPS OPHTHALMIC
Status: DISCONTINUED | OUTPATIENT
Start: 2021-01-01 | End: 2021-01-01

## 2021-01-01 RX ORDER — ACETAMINOPHEN 325 MG/1
650 TABLET ORAL
COMMUNITY

## 2021-01-01 RX ORDER — QUETIAPINE FUMARATE 50 MG/1
150 TABLET, FILM COATED ORAL 2 TIMES DAILY
COMMUNITY

## 2021-01-01 RX ORDER — CARBOXYMETHYLCELLULOSE SODIUM 10 MG/ML
1 GEL OPHTHALMIC
Status: DISCONTINUED | OUTPATIENT
Start: 2021-01-01 | End: 2021-01-01 | Stop reason: HOSPADM

## 2021-01-01 RX ORDER — SODIUM CHLORIDE 0.9 % (FLUSH) 0.9 %
3 SYRINGE (ML) INJECTION AS NEEDED
Status: DISCONTINUED | OUTPATIENT
Start: 2021-01-01 | End: 2021-01-01

## 2021-01-01 RX ORDER — HALOPERIDOL 2 MG/ML
5 SOLUTION ORAL EVERY 8 HOURS
Status: DISCONTINUED | OUTPATIENT
Start: 2021-01-01 | End: 2021-01-01

## 2021-01-01 RX ADMIN — SODIUM CHLORIDE, PRESERVATIVE FREE 3 ML: 5 INJECTION INTRAVENOUS at 18:55

## 2021-01-01 RX ADMIN — SODIUM CHLORIDE, PRESERVATIVE FREE 3 ML: 5 INJECTION INTRAVENOUS at 13:06

## 2021-01-01 RX ADMIN — SODIUM CHLORIDE, PRESERVATIVE FREE 3 ML: 5 INJECTION INTRAVENOUS at 08:21

## 2021-01-01 RX ADMIN — HALOPERIDOL LACTATE 5 MG: 5 INJECTION, SOLUTION INTRAMUSCULAR at 23:38

## 2021-01-01 RX ADMIN — HALOPERIDOL LACTATE 5 MG: 5 INJECTION, SOLUTION INTRAMUSCULAR at 01:18

## 2021-01-01 RX ADMIN — SODIUM CHLORIDE, PRESERVATIVE FREE 3 ML: 5 INJECTION INTRAVENOUS at 11:17

## 2021-01-01 RX ADMIN — WATER 10 MG: 1 INJECTION INTRAMUSCULAR; INTRAVENOUS; SUBCUTANEOUS at 17:01

## 2021-01-01 RX ADMIN — HALOPERIDOL LACTATE 4 MG: 5 INJECTION, SOLUTION INTRAMUSCULAR at 14:42

## 2021-01-01 RX ADMIN — SODIUM CHLORIDE, PRESERVATIVE FREE 3 ML: 5 INJECTION INTRAVENOUS at 14:42

## 2021-01-01 RX ADMIN — MORPHINE SULFATE 2 MG: 2 INJECTION, SOLUTION INTRAMUSCULAR; INTRAVENOUS at 20:56

## 2021-01-01 RX ADMIN — LORAZEPAM 1 MG: 2 INJECTION INTRAMUSCULAR; INTRAVENOUS at 16:09

## 2021-01-01 RX ADMIN — MORPHINE SULFATE 2 MG: 2 INJECTION, SOLUTION INTRAMUSCULAR; INTRAVENOUS at 05:17

## 2021-01-01 RX ADMIN — CARBOXYMETHYLCELLULOSE SODIUM 1 DROP: 10 GEL OPHTHALMIC at 12:07

## 2021-01-01 RX ADMIN — CARBOXYMETHYLCELLULOSE SODIUM 1 DROP: 10 GEL OPHTHALMIC at 07:30

## 2021-01-01 RX ADMIN — CARBOXYMETHYLCELLULOSE SODIUM 1 DROP: 10 GEL OPHTHALMIC at 18:37

## 2021-01-01 RX ADMIN — HALOPERIDOL LACTATE 5 MG: 5 INJECTION, SOLUTION INTRAMUSCULAR at 00:30

## 2021-01-01 RX ADMIN — LORAZEPAM 0.5 MG: 2 INJECTION INTRAMUSCULAR; INTRAVENOUS at 14:04

## 2021-01-01 RX ADMIN — LORAZEPAM 1 MG: 2 INJECTION INTRAMUSCULAR; INTRAVENOUS at 05:13

## 2021-01-01 RX ADMIN — MORPHINE SULFATE 2 MG: 2 INJECTION, SOLUTION INTRAMUSCULAR; INTRAVENOUS at 13:53

## 2021-01-01 RX ADMIN — MORPHINE SULFATE 2 MG: 2 INJECTION, SOLUTION INTRAMUSCULAR; INTRAVENOUS at 21:16

## 2021-01-01 RX ADMIN — WATER 10 MG: 1 INJECTION INTRAMUSCULAR; INTRAVENOUS; SUBCUTANEOUS at 06:15

## 2021-01-01 RX ADMIN — HALOPERIDOL LACTATE 5 MG: 5 INJECTION, SOLUTION INTRAMUSCULAR at 19:19

## 2021-01-01 RX ADMIN — SODIUM CHLORIDE, PRESERVATIVE FREE 3 ML: 5 INJECTION INTRAVENOUS at 06:03

## 2021-01-01 RX ADMIN — LORAZEPAM 0.5 MG: 2 INJECTION INTRAMUSCULAR; INTRAVENOUS at 01:04

## 2021-01-01 RX ADMIN — MORPHINE SULFATE 2 MG: 2 INJECTION, SOLUTION INTRAMUSCULAR; INTRAVENOUS at 13:06

## 2021-01-01 RX ADMIN — HALOPERIDOL LACTATE 4 MG: 5 INJECTION, SOLUTION INTRAMUSCULAR at 06:03

## 2021-01-01 RX ADMIN — OLANZAPINE 5 MG: 5 TABLET, ORALLY DISINTEGRATING ORAL at 11:50

## 2021-01-01 RX ADMIN — HALOPERIDOL LACTATE 5 MG: 5 INJECTION, SOLUTION INTRAMUSCULAR at 05:49

## 2021-01-01 RX ADMIN — HALOPERIDOL LACTATE 5 MG: 5 INJECTION, SOLUTION INTRAMUSCULAR at 23:23

## 2021-01-01 RX ADMIN — SODIUM CHLORIDE, PRESERVATIVE FREE 3 ML: 5 INJECTION INTRAVENOUS at 00:19

## 2021-01-01 RX ADMIN — SODIUM CHLORIDE, PRESERVATIVE FREE 3 ML: 5 INJECTION INTRAVENOUS at 10:57

## 2021-01-01 RX ADMIN — LORAZEPAM 1 MG: 2 INJECTION INTRAMUSCULAR; INTRAVENOUS at 23:24

## 2021-01-01 RX ADMIN — LORAZEPAM 0.5 MG: 2 INJECTION INTRAMUSCULAR; INTRAVENOUS at 20:56

## 2021-01-01 RX ADMIN — HALOPERIDOL LACTATE 5 MG: 5 INJECTION, SOLUTION INTRAMUSCULAR at 18:25

## 2021-01-01 RX ADMIN — HALOPERIDOL LACTATE 5 MG: 5 INJECTION, SOLUTION INTRAMUSCULAR at 11:24

## 2021-01-01 RX ADMIN — LORAZEPAM 1 MG: 2 INJECTION INTRAMUSCULAR; INTRAVENOUS at 16:55

## 2021-01-01 RX ADMIN — LORAZEPAM 1 MG: 2 INJECTION INTRAMUSCULAR; INTRAVENOUS at 05:50

## 2021-01-01 RX ADMIN — SODIUM CHLORIDE, PRESERVATIVE FREE 3 ML: 5 INJECTION INTRAVENOUS at 00:09

## 2021-01-01 RX ADMIN — WATER 5 MG: 1 INJECTION INTRAMUSCULAR; INTRAVENOUS; SUBCUTANEOUS at 04:44

## 2021-01-01 RX ADMIN — SODIUM CHLORIDE, PRESERVATIVE FREE 3 ML: 5 INJECTION INTRAVENOUS at 10:50

## 2021-01-01 RX ADMIN — LORAZEPAM 1 MG: 2 INJECTION INTRAMUSCULAR; INTRAVENOUS at 05:42

## 2021-01-01 RX ADMIN — MORPHINE SULFATE 2 MG: 2 INJECTION, SOLUTION INTRAMUSCULAR; INTRAVENOUS at 03:28

## 2021-01-01 RX ADMIN — HALOPERIDOL LACTATE 5 MG: 5 INJECTION, SOLUTION INTRAMUSCULAR at 13:05

## 2021-01-01 RX ADMIN — HALOPERIDOL LACTATE 5 MG: 5 INJECTION, SOLUTION INTRAMUSCULAR at 16:54

## 2021-01-01 RX ADMIN — SODIUM CHLORIDE, PRESERVATIVE FREE 3 ML: 5 INJECTION INTRAVENOUS at 17:38

## 2021-01-01 RX ADMIN — LORAZEPAM 1 MG: 2 INJECTION INTRAMUSCULAR; INTRAVENOUS at 13:53

## 2021-01-01 RX ADMIN — CARBOXYMETHYLCELLULOSE SODIUM 1 DROP: 10 GEL OPHTHALMIC at 10:45

## 2021-01-01 RX ADMIN — HALOPERIDOL LACTATE 5 MG: 5 INJECTION, SOLUTION INTRAMUSCULAR at 20:46

## 2021-01-01 RX ADMIN — HALOPERIDOL LACTATE 5 MG: 5 INJECTION, SOLUTION INTRAMUSCULAR at 12:26

## 2021-01-01 RX ADMIN — SODIUM CHLORIDE, PRESERVATIVE FREE 3 ML: 5 INJECTION INTRAVENOUS at 08:31

## 2021-01-01 RX ADMIN — CARBOXYMETHYLCELLULOSE SODIUM 1 DROP: 10 GEL OPHTHALMIC at 19:41

## 2021-01-01 RX ADMIN — MORPHINE SULFATE 2 MG: 2 INJECTION, SOLUTION INTRAMUSCULAR; INTRAVENOUS at 20:49

## 2021-01-01 RX ADMIN — MORPHINE SULFATE 2 MG: 2 INJECTION, SOLUTION INTRAMUSCULAR; INTRAVENOUS at 21:50

## 2021-01-01 RX ADMIN — WATER 5 MG: 1 INJECTION INTRAMUSCULAR; INTRAVENOUS; SUBCUTANEOUS at 19:14

## 2021-01-01 RX ADMIN — HALOPERIDOL LACTATE 4 MG: 5 INJECTION, SOLUTION INTRAMUSCULAR at 21:31

## 2021-01-01 RX ADMIN — MORPHINE SULFATE 2 MG: 2 INJECTION, SOLUTION INTRAMUSCULAR; INTRAVENOUS at 23:25

## 2021-01-01 RX ADMIN — HALOPERIDOL LACTATE 5 MG: 5 INJECTION, SOLUTION INTRAMUSCULAR at 13:08

## 2021-01-01 RX ADMIN — MORPHINE SULFATE 2 MG: 2 INJECTION, SOLUTION INTRAMUSCULAR; INTRAVENOUS at 00:57

## 2021-01-01 RX ADMIN — HALOPERIDOL LACTATE 2 MG: 5 INJECTION, SOLUTION INTRAMUSCULAR at 17:37

## 2021-01-01 RX ADMIN — HALOPERIDOL LACTATE 5 MG: 5 INJECTION, SOLUTION INTRAMUSCULAR at 07:01

## 2021-01-01 RX ADMIN — MORPHINE SULFATE 2 MG: 2 INJECTION, SOLUTION INTRAMUSCULAR; INTRAVENOUS at 00:19

## 2021-01-01 RX ADMIN — LORAZEPAM 0.5 MG: 2 INJECTION INTRAMUSCULAR; INTRAVENOUS at 20:49

## 2021-01-01 RX ADMIN — WATER 10 MG: 1 INJECTION INTRAMUSCULAR; INTRAVENOUS; SUBCUTANEOUS at 17:29

## 2021-01-01 RX ADMIN — MORPHINE SULFATE 2 MG: 2 INJECTION, SOLUTION INTRAMUSCULAR; INTRAVENOUS at 03:41

## 2021-01-01 RX ADMIN — LORAZEPAM 0.5 MG: 2 INJECTION INTRAMUSCULAR; INTRAVENOUS at 03:17

## 2021-01-01 RX ADMIN — HALOPERIDOL LACTATE 5 MG: 5 INJECTION, SOLUTION INTRAMUSCULAR at 05:42

## 2021-01-01 RX ADMIN — SODIUM CHLORIDE, PRESERVATIVE FREE 3 ML: 5 INJECTION INTRAVENOUS at 05:17

## 2021-01-01 RX ADMIN — MORPHINE SULFATE 2 MG: 2 INJECTION, SOLUTION INTRAMUSCULAR; INTRAVENOUS at 10:57

## 2021-01-01 RX ADMIN — WATER 5 MG: 1 INJECTION INTRAMUSCULAR; INTRAVENOUS; SUBCUTANEOUS at 23:47

## 2021-01-01 RX ADMIN — HALOPERIDOL LACTATE 4 MG: 5 INJECTION, SOLUTION INTRAMUSCULAR at 22:31

## 2021-01-01 RX ADMIN — MORPHINE SULFATE 2 MG: 2 INJECTION, SOLUTION INTRAMUSCULAR; INTRAVENOUS at 11:24

## 2021-01-01 RX ADMIN — OLANZAPINE 10 MG: 5 TABLET, ORALLY DISINTEGRATING ORAL at 19:19

## 2021-01-01 RX ADMIN — HALOPERIDOL LACTATE 5 MG: 5 INJECTION, SOLUTION INTRAMUSCULAR at 10:38

## 2021-01-01 RX ADMIN — WATER 5 MG: 1 INJECTION INTRAMUSCULAR; INTRAVENOUS; SUBCUTANEOUS at 20:52

## 2021-01-01 RX ADMIN — HALOPERIDOL LACTATE 5 MG: 5 INJECTION, SOLUTION INTRAMUSCULAR at 06:16

## 2021-01-01 RX ADMIN — MORPHINE SULFATE 2 MG: 2 INJECTION, SOLUTION INTRAMUSCULAR; INTRAVENOUS at 19:28

## 2021-01-01 RX ADMIN — HALOPERIDOL LACTATE 5 MG: 5 INJECTION, SOLUTION INTRAMUSCULAR at 11:50

## 2021-01-01 RX ADMIN — MORPHINE SULFATE 2 MG: 2 INJECTION, SOLUTION INTRAMUSCULAR; INTRAVENOUS at 12:40

## 2021-01-01 RX ADMIN — WATER 10 MG: 1 INJECTION INTRAMUSCULAR; INTRAVENOUS; SUBCUTANEOUS at 07:00

## 2021-01-01 RX ADMIN — WATER 10 MG: 1 INJECTION INTRAMUSCULAR; INTRAVENOUS; SUBCUTANEOUS at 05:00

## 2021-01-01 RX ADMIN — SODIUM CHLORIDE, PRESERVATIVE FREE 3 ML: 5 INJECTION INTRAVENOUS at 03:43

## 2021-01-01 RX ADMIN — HALOPERIDOL LACTATE 5 MG: 5 INJECTION, SOLUTION INTRAMUSCULAR at 17:33

## 2021-01-01 RX ADMIN — HALOPERIDOL LACTATE 5 MG: 5 INJECTION, SOLUTION INTRAMUSCULAR at 19:37

## 2021-01-01 RX ADMIN — WATER 5 MG: 1 INJECTION INTRAMUSCULAR; INTRAVENOUS; SUBCUTANEOUS at 19:29

## 2021-01-01 RX ADMIN — SODIUM CHLORIDE, PRESERVATIVE FREE 3 ML: 5 INJECTION INTRAVENOUS at 22:53

## 2021-01-01 RX ADMIN — MORPHINE SULFATE 2 MG: 2 INJECTION, SOLUTION INTRAMUSCULAR; INTRAVENOUS at 06:22

## 2021-01-01 RX ADMIN — HALOPERIDOL LACTATE 4 MG: 5 INJECTION, SOLUTION INTRAMUSCULAR at 15:04

## 2021-01-01 RX ADMIN — MORPHINE SULFATE 2 MG: 2 INJECTION, SOLUTION INTRAMUSCULAR; INTRAVENOUS at 07:33

## 2021-01-01 RX ADMIN — SODIUM CHLORIDE, PRESERVATIVE FREE 3 ML: 5 INJECTION INTRAVENOUS at 07:55

## 2021-01-01 RX ADMIN — CARBOXYMETHYLCELLULOSE SODIUM 1 DROP: 10 GEL OPHTHALMIC at 09:05

## 2021-01-01 RX ADMIN — SODIUM CHLORIDE, PRESERVATIVE FREE 3 ML: 5 INJECTION INTRAVENOUS at 18:00

## 2021-01-01 RX ADMIN — SODIUM CHLORIDE, PRESERVATIVE FREE 3 ML: 5 INJECTION INTRAVENOUS at 19:28

## 2021-01-01 RX ADMIN — WATER 10 MG: 1 INJECTION INTRAMUSCULAR; INTRAVENOUS; SUBCUTANEOUS at 22:39

## 2021-01-01 RX ADMIN — HALOPERIDOL LACTATE 2 MG: 5 INJECTION, SOLUTION INTRAMUSCULAR at 18:55

## 2021-01-01 RX ADMIN — WATER 10 MG: 1 INJECTION INTRAMUSCULAR; INTRAVENOUS; SUBCUTANEOUS at 11:51

## 2021-01-01 RX ADMIN — WATER 10 MG: 1 INJECTION INTRAMUSCULAR; INTRAVENOUS; SUBCUTANEOUS at 21:55

## 2021-01-01 RX ADMIN — MORPHINE SULFATE 2 MG: 2 INJECTION, SOLUTION INTRAMUSCULAR; INTRAVENOUS at 06:18

## 2021-01-01 RX ADMIN — WATER 10 MG: 1 INJECTION INTRAMUSCULAR; INTRAVENOUS; SUBCUTANEOUS at 05:36

## 2021-01-01 RX ADMIN — WATER 10 MG: 1 INJECTION INTRAMUSCULAR; INTRAVENOUS; SUBCUTANEOUS at 21:15

## 2021-01-01 RX ADMIN — WATER 10 MG: 1 INJECTION INTRAMUSCULAR; INTRAVENOUS; SUBCUTANEOUS at 05:49

## 2021-01-01 RX ADMIN — SODIUM CHLORIDE, PRESERVATIVE FREE 3 ML: 5 INJECTION INTRAVENOUS at 16:55

## 2021-01-01 RX ADMIN — LORAZEPAM 0.5 MG: 2 INJECTION INTRAMUSCULAR; INTRAVENOUS at 19:38

## 2021-01-01 RX ADMIN — HALOPERIDOL LACTATE 2 MG: 5 INJECTION, SOLUTION INTRAMUSCULAR at 11:16

## 2021-01-01 RX ADMIN — LORAZEPAM 1 MG: 1 TABLET ORAL at 11:50

## 2021-01-01 RX ADMIN — HALOPERIDOL LACTATE 5 MG: 5 INJECTION, SOLUTION INTRAMUSCULAR at 23:03

## 2021-01-01 RX ADMIN — WATER 5 MG: 1 INJECTION INTRAMUSCULAR; INTRAVENOUS; SUBCUTANEOUS at 22:05

## 2021-01-01 RX ADMIN — LORAZEPAM 1 MG: 1 TABLET ORAL at 19:19

## 2021-01-01 RX ADMIN — BISACODYL 10 MG: 10 SUPPOSITORY RECTAL at 14:00

## 2021-01-01 RX ADMIN — LORAZEPAM 0.5 MG: 2 INJECTION INTRAMUSCULAR; INTRAVENOUS at 13:53

## 2021-01-01 RX ADMIN — SODIUM CHLORIDE, PRESERVATIVE FREE 3 ML: 5 INJECTION INTRAVENOUS at 01:04

## 2021-01-01 RX ADMIN — LORAZEPAM 1 MG: 2 INJECTION INTRAMUSCULAR; INTRAVENOUS at 23:53

## 2021-01-01 RX ADMIN — CARBOXYMETHYLCELLULOSE SODIUM 1 DROP: 10 GEL OPHTHALMIC at 13:53

## 2021-01-01 RX ADMIN — HALOPERIDOL LACTATE 4 MG: 5 INJECTION, SOLUTION INTRAMUSCULAR at 07:23

## 2021-01-01 RX ADMIN — HALOPERIDOL LACTATE 2 MG: 5 INJECTION, SOLUTION INTRAMUSCULAR at 10:49

## 2021-01-01 RX ADMIN — HALOPERIDOL LACTATE 5 MG: 5 INJECTION, SOLUTION INTRAMUSCULAR at 12:02

## 2021-01-01 RX ADMIN — LORAZEPAM 1 MG: 2 INJECTION INTRAMUSCULAR; INTRAVENOUS at 14:42

## 2021-01-01 RX ADMIN — HALOPERIDOL LACTATE 5 MG: 5 INJECTION, SOLUTION INTRAMUSCULAR at 23:53

## 2021-01-01 RX ADMIN — CARBOXYMETHYLCELLULOSE SODIUM 1 DROP: 10 GEL OPHTHALMIC at 20:21

## 2021-01-01 RX ADMIN — HALOPERIDOL LACTATE 5 MG: 5 INJECTION, SOLUTION INTRAMUSCULAR at 05:59

## 2021-01-01 RX ADMIN — LORAZEPAM 0.5 MG: 2 INJECTION INTRAMUSCULAR; INTRAVENOUS at 09:48

## 2021-01-01 RX ADMIN — WATER 10 MG: 1 INJECTION INTRAMUSCULAR; INTRAVENOUS; SUBCUTANEOUS at 09:38

## 2021-01-01 RX ADMIN — HALOPERIDOL LACTATE 5 MG: 5 INJECTION, SOLUTION INTRAMUSCULAR at 11:36

## 2021-01-01 RX ADMIN — WATER 10 MG: 1 INJECTION INTRAMUSCULAR; INTRAVENOUS; SUBCUTANEOUS at 16:21

## 2021-01-01 RX ADMIN — HALOPERIDOL LACTATE 5 MG: 5 INJECTION, SOLUTION INTRAMUSCULAR at 17:00

## 2021-01-01 RX ADMIN — WATER 10 MG: 1 INJECTION INTRAMUSCULAR; INTRAVENOUS; SUBCUTANEOUS at 17:21

## 2021-01-01 RX ADMIN — WATER 10 MG: 1 INJECTION INTRAMUSCULAR; INTRAVENOUS; SUBCUTANEOUS at 19:37

## 2021-01-01 RX ADMIN — MORPHINE SULFATE 2 MG: 2 INJECTION, SOLUTION INTRAMUSCULAR; INTRAVENOUS at 17:37

## 2021-01-01 RX ADMIN — WATER 10 MG: 1 INJECTION INTRAMUSCULAR; INTRAVENOUS; SUBCUTANEOUS at 18:24

## 2021-01-01 RX ADMIN — MORPHINE SULFATE 2 MG: 2 INJECTION, SOLUTION INTRAMUSCULAR; INTRAVENOUS at 00:51

## 2021-01-01 RX ADMIN — WATER 10 MG: 1 INJECTION INTRAMUSCULAR; INTRAVENOUS; SUBCUTANEOUS at 17:33

## 2021-01-01 RX ADMIN — LORAZEPAM 1 MG: 2 INJECTION INTRAMUSCULAR; INTRAVENOUS at 00:57

## 2021-01-01 RX ADMIN — HALOPERIDOL LACTATE 5 MG: 5 INJECTION, SOLUTION INTRAMUSCULAR at 10:54

## 2021-01-01 RX ADMIN — MORPHINE SULFATE 2 MG: 2 INJECTION, SOLUTION INTRAMUSCULAR; INTRAVENOUS at 23:03

## 2021-01-01 RX ADMIN — LORAZEPAM 0.5 MG: 2 INJECTION INTRAMUSCULAR; INTRAVENOUS at 19:27

## 2021-01-01 RX ADMIN — LORAZEPAM 0.5 MG: 2 INJECTION INTRAMUSCULAR; INTRAVENOUS at 07:54

## 2021-01-01 RX ADMIN — HALOPERIDOL LACTATE 2 MG: 5 INJECTION, SOLUTION INTRAMUSCULAR at 00:18

## 2021-01-01 RX ADMIN — SODIUM CHLORIDE, PRESERVATIVE FREE 3 ML: 5 INJECTION INTRAVENOUS at 03:18

## 2021-01-01 RX ADMIN — SODIUM CHLORIDE, PRESERVATIVE FREE 3 ML: 5 INJECTION INTRAVENOUS at 10:55

## 2021-01-01 RX ADMIN — SODIUM CHLORIDE, PRESERVATIVE FREE 3 ML: 5 INJECTION INTRAVENOUS at 13:54

## 2021-01-01 RX ADMIN — WATER 10 MG: 1 INJECTION INTRAMUSCULAR; INTRAVENOUS; SUBCUTANEOUS at 04:00

## 2021-01-01 RX ADMIN — MORPHINE SULFATE 2 MG: 2 INJECTION, SOLUTION INTRAMUSCULAR; INTRAVENOUS at 02:22

## 2021-01-01 RX ADMIN — LORAZEPAM 1 MG: 2 INJECTION INTRAMUSCULAR; INTRAVENOUS at 22:29

## 2021-01-01 RX ADMIN — LORAZEPAM 0.5 MG: 2 INJECTION INTRAMUSCULAR; INTRAVENOUS at 15:04

## 2021-01-01 RX ADMIN — HALOPERIDOL LACTATE 4 MG: 5 INJECTION, SOLUTION INTRAMUSCULAR at 00:09

## 2021-01-01 RX ADMIN — LORAZEPAM 1 MG: 1 TABLET ORAL at 05:36

## 2021-01-01 RX ADMIN — LORAZEPAM 0.5 MG: 2 INJECTION INTRAMUSCULAR; INTRAVENOUS at 08:31

## 2021-01-01 RX ADMIN — HALOPERIDOL LACTATE 5 MG: 5 INJECTION, SOLUTION INTRAMUSCULAR at 12:39

## 2021-01-01 RX ADMIN — HALOPERIDOL LACTATE 5 MG: 5 INJECTION, SOLUTION INTRAMUSCULAR at 00:32

## 2021-01-01 RX ADMIN — HALOPERIDOL LACTATE 4 MG: 5 INJECTION, SOLUTION INTRAMUSCULAR at 05:50

## 2021-01-01 RX ADMIN — SODIUM CHLORIDE, PRESERVATIVE FREE 3 ML: 5 INJECTION INTRAVENOUS at 00:50

## 2021-01-01 RX ADMIN — LORAZEPAM 1 MG: 2 INJECTION INTRAMUSCULAR; INTRAVENOUS at 18:00

## 2021-01-01 RX ADMIN — HALOPERIDOL LACTATE 4 MG: 5 INJECTION, SOLUTION INTRAMUSCULAR at 05:17

## 2021-01-01 RX ADMIN — LORAZEPAM 1 MG: 1 TABLET ORAL at 23:23

## 2021-01-01 RX ADMIN — MORPHINE SULFATE 2 MG: 2 INJECTION, SOLUTION INTRAMUSCULAR; INTRAVENOUS at 09:41

## 2021-01-01 RX ADMIN — HALOPERIDOL LACTATE 5 MG: 5 INJECTION, SOLUTION INTRAMUSCULAR at 17:29

## 2021-01-01 RX ADMIN — HALOPERIDOL LACTATE 5 MG: 5 INJECTION, SOLUTION INTRAMUSCULAR at 05:36

## 2021-01-01 RX ADMIN — MORPHINE SULFATE 2 MG: 2 INJECTION, SOLUTION INTRAMUSCULAR; INTRAVENOUS at 18:54

## 2021-01-01 RX ADMIN — SODIUM CHLORIDE, PRESERVATIVE FREE 3 ML: 5 INJECTION INTRAVENOUS at 09:48

## 2021-01-01 RX ADMIN — HALOPERIDOL LACTATE 4 MG: 5 INJECTION, SOLUTION INTRAMUSCULAR at 05:14

## 2021-01-01 RX ADMIN — MORPHINE SULFATE 2 MG: 2 INJECTION, SOLUTION INTRAMUSCULAR; INTRAVENOUS at 23:57

## 2021-01-01 RX ADMIN — WATER 10 MG: 1 INJECTION INTRAMUSCULAR; INTRAVENOUS; SUBCUTANEOUS at 05:58

## 2021-01-01 RX ADMIN — WATER 5 MG: 1 INJECTION INTRAMUSCULAR; INTRAVENOUS; SUBCUTANEOUS at 01:31

## 2021-01-01 RX ADMIN — SODIUM CHLORIDE, PRESERVATIVE FREE 3 ML: 5 INJECTION INTRAVENOUS at 20:50

## 2021-01-01 RX ADMIN — HALOPERIDOL LACTATE 5 MG: 5 INJECTION, SOLUTION INTRAMUSCULAR at 09:40

## 2021-01-01 RX ADMIN — CARBOXYMETHYLCELLULOSE SODIUM 1 DROP: 10 GEL OPHTHALMIC at 05:37

## 2021-01-01 RX ADMIN — LORAZEPAM 0.5 MG: 2 INJECTION INTRAMUSCULAR; INTRAVENOUS at 14:44

## 2021-01-01 RX ADMIN — HALOPERIDOL LACTATE 5 MG: 5 INJECTION, SOLUTION INTRAMUSCULAR at 05:01

## 2021-01-01 RX ADMIN — MORPHINE SULFATE 2 MG: 2 INJECTION, SOLUTION INTRAMUSCULAR; INTRAVENOUS at 10:38

## 2021-01-01 RX ADMIN — WATER 5 MG: 1 INJECTION INTRAMUSCULAR; INTRAVENOUS; SUBCUTANEOUS at 09:45

## 2021-01-01 RX ADMIN — SODIUM CHLORIDE, PRESERVATIVE FREE 3 ML: 5 INJECTION INTRAVENOUS at 14:44

## 2021-01-01 RX ADMIN — LORAZEPAM 1 MG: 2 INJECTION INTRAMUSCULAR; INTRAVENOUS at 10:54

## 2021-01-01 RX ADMIN — SODIUM CHLORIDE, PRESERVATIVE FREE 3 ML: 5 INJECTION INTRAVENOUS at 20:57

## 2021-01-01 RX ADMIN — MORPHINE SULFATE 2 MG: 2 INJECTION, SOLUTION INTRAMUSCULAR; INTRAVENOUS at 18:00

## 2021-12-07 PROBLEM — E53.8 VITAMIN B12 DEFICIENCY: Status: ACTIVE | Noted: 2017-01-27

## 2021-12-07 PROBLEM — G93.41 METABOLIC ENCEPHALOPATHY: Status: ACTIVE | Noted: 2021-01-01

## 2021-12-07 PROBLEM — A41.9 SEPSIS (HCC): Status: ACTIVE | Noted: 2021-01-01

## 2021-12-07 PROBLEM — R53.1 RIGHT SIDED WEAKNESS: Status: ACTIVE | Noted: 2019-07-01

## 2021-12-07 PROBLEM — R41.3 MEMORY LOSS: Status: ACTIVE | Noted: 2017-01-26
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
## 2021-12-07 PROBLEM — S42.021D: Status: ACTIVE | Noted: 2018-06-15

## 2021-12-07 PROBLEM — E78.00 PURE HYPERCHOLESTEROLEMIA: Status: ACTIVE | Noted: 2017-01-26

## 2021-12-07 PROBLEM — G93.40 ENCEPHALOPATHY: Status: ACTIVE | Noted: 2019-07-01

## 2021-12-07 PROBLEM — F41.8 DEPRESSION WITH ANXIETY: Status: ACTIVE | Noted: 2017-01-26

## 2021-12-07 PROBLEM — R22.1 NECK MASS: Status: ACTIVE | Noted: 2019-08-05

## 2021-12-07 PROBLEM — F03.918 DEMENTIA WITH BEHAVIORAL DISTURBANCE: Status: ACTIVE | Noted: 2017-05-16

## 2021-12-07 PROBLEM — J18.9 PNEUMONIA: Status: ACTIVE | Noted: 2021-01-01

## 2021-12-08 NOTE — PROGRESS NOTES
Problem: Pressure Injury - Risk of  Goal: *Prevention of pressure injury  Description: Document Brendon Scale and appropriate interventions in the flowsheet. Outcome: Progressing Towards Goal  Note: Pressure Injury Interventions:  Sensory Interventions: Assess changes in LOC, Float heels, Minimize linen layers, Pressure redistribution bed/mattress (bed type), Check visual cues for pain, Keep linens dry and wrinkle-free, Pad between skin to skin         Activity Interventions: Pressure redistribution bed/mattress(bed type)    Mobility Interventions: Pressure redistribution bed/mattress (bed type)    Nutrition Interventions: Document food/fluid/supplement intake    Friction and Shear Interventions: Apply protective barrier, creams and emollients, Minimize layers, Lift sheet         Patient Mendel Bella Ouch will remain free from alterations in skin integrity AEB absence of impaired skin during assessment each shift during inpatient hospice stay. Problem: Hospice Orientation  Goal: Demonstrate understanding of hospice philosophy, plan of care, and home hospice program  Description: The patient/family/caregiver will demonstrate understanding of hospice philosophy, plan of care and the home hospice program as evidenced by participation in meeting the patient's psychosocial, spiritual, medical, and physical needs inclusive of medical supplies/equipment focusing on symptoms. Outcome: Progressing Towards Goal     Problem: Potential for Alteration in Skin Integrity  Goal: Monitor skin for areas of alteration in skin integrity  Description: Patient/family/caregiver will demonstrate ability to care for patient's skin, monitor for areas of breakdown, and demonstrate methods to prevent breakdown during hospice care.   Outcome: Progressing Towards Goal  Note:   Patient Mendel Bella Ouch will remain free from alterations in skin integrity AEB absence of impaired skin during assessment each shift during inpatient hospice stay. Problem: Risk for Falls  Goal: Free of falls during inpatient stay  Description: Patient will be free of falls during inpatient stay. Outcome: Progressing Towards Goal  Note:   Patient Mendel Augusto Grace will remain free from falls AEB no injuries r/t falls each shift during inpatient hospice stay. Problem: Alteration in Mobility  Goal: Remain as independent as possible and remain safe in environment  Description: Patient will remain as independent as possible and remain safe in their environment. Outcome: Progressing Towards Goal  Note:   Patient Mendel Augusto Grace will be able to maintain current activity level during hospice inpatient admission AEB absence of injury each shift during inpatient hospice stay. Problem: Pain  Goal: Assess satisfaction of level of comfort and symptom control  Outcome: Progressing Towards Goal  Note:   Patient Mendel Augusto Grace will exhibit decrease in pain AEB rating pain less than * on 1-10 scale or * FLACC score within each shift of receiving pain medication during inpatient hospice stay. Goal: *Control of acute pain  Outcome: Progressing Towards Goal     Problem: Anxiety/Agitation  Goal: Verbalize or staff assess the ability to manage anxiety  Description: The patient/family/caregiver will verbalize and demonstrate ability to manage the patient's anxiety throughout hospice care. Outcome: Progressing Towards Goal  Note:   Patient Mendel Augusto Grace will demonstrate appropriate motor behavior AEB less than 2 episodes of fidgety, picking or pulling at clothes on devices, yelling out, getting out of bed, etc. each shift during inpatient hospice stay. Problem: Communication Deficit  Goal: Effectively communicate symptoms, needs, and concerns  Description: Patient/family/caregiver will effectively communicate symptoms, needs and concerns.   Outcome: Progressing Towards Goal  Note: Patient Mendel Augusto Grace has potential a communication deficit r/t decreased cognition, vision, hearing, or language barrier aeb inability to communicate verbally or non-verbally. Pt will be kept comfortable despite ability to convey implied needs while admitted at the [address] [place] [address]. Problem: Breathing Pattern - Ineffective  Goal: *Use of effective breathing techniques  Outcome: Progressing Towards Goal  Note:   Patient Mendel Marchia Brasil will indicate effective breathing pattern AEB absence of respiratory distress each shift during inpatient hospice stay. Problem: Nausea/Vomiting (Adult)  Goal: *Absence of nausea/vomiting  Outcome: Progressing Towards Goal     Problem: Infection - Risk of, Central Venous Catheter-Associated Bloodstream Infection  Goal: *Absence of infection signs and symptoms  Outcome: Progressing Towards Goal     Problem: Infection - Risk of, Urinary Catheter-Associated Urinary Tract Infection  Goal: *Absence of infection signs and symptoms  Outcome: Progressing Towards Goal     Problem: End of Life Process  Goal: Demonstrate understanding of end of life processes  Description: Patient/caregiver will understand end of life processes.   Outcome: Progressing Towards Goal     Problem: Dyspnea Due to End of Life  Goal: Demonstrate understanding of and ability to manage respiratory symptoms at end of life  Outcome: Progressing Towards Goal     Problem: Discharge Planning  Goal: *Participates in discharge planning  Outcome: Progressing Towards Goal

## 2021-12-08 NOTE — PROGRESS NOTES
Home Flores, 62 y.o. F. Arrived from [facility] to room 101 at the [facility]. Pt medicated prior to transfer with Morphine 2 mg IVP. Pt admitted with DX: Sepsis and aspiration pneumonia. LOC: GIP. Pt is a DNR. Pt transferred to bed. Pt repositioned. Pt with eyes opened. Non verbal. No facial grimace. Flacc =0-1. Resp irreg non labored on 2 L n/c. Lungs with bilateral rales. HR irreg. BS active. No edema noted at this time. Melo cath draining orange clear urine. BLE with contractures. Allevyn dressings no rachel  Areas and back for protection. Peripheral line flushed with 3 ml ns. Flushed well. Dressing clean/dry/intact. Admission complete and initial General Hospice care initiated which includes spiritual,psychosocial, bereavement, Md, and IDG team. IDG team made aware of plan of care and immediate needs. Pt is under GIP level of care. Will continue to assess need for change of level of care. Collaborate with IDG team regarding discharge planning. Pt is expected to  under [facility] level of care. Reviewed care plan with CNA. 2341  Pt resting comfortably with eyes closed. No facial grimace. Flacc =0-1. Resp non labored on 2 L n/c. SR up x 2. Bed low/locked. Call light with in reach. Door opened. 0018  Pt crying out. Facial grimace noted. Flacc =5. Pt restless, agitated. Morphine 2 mg and haldol 2 mg IVP given. Pt repositioned in bed. SR up x 2. Bed low/locked. Call light with in reach. Door opened. 0050  Pt remains crying out and agitated. Morphine 2 mg IVP given. Pt repositioned in bed.     0131  Pt agitated. Cruing out. Dr. Carolyn Washington notified and order for Geodon 5 mg IM  Q 6 hours prn received. Geodon 5 mg given IM. Pt repositioned in bed. SR up x 2. Bed low/locked. Call light with in reach. Door opened. 0215  Pt resting comfortably with eyes closed. No facial grimace. Flacc =0-1. Resp non labored on 2 L n/c. SR up x 2. Bed low/locked. Call light with in reach. Door opened.        0344  Pt with eyes closed. No crying out. No agitation. Flacc =0-1. Resp non labored on 2 L n/c. SR up x 2. Bed low/locked. Call light with in reach. Door opened. 0604  Pt resting comfortably with eyes closed. No facial grimace. No crying out. No agitation. Flacc =0-1. Resp non labored on 2 L n/c. SR up x 2. Bed low/locked. Call light with in reach. Door opened. Report given to Alena Dickens RN.

## 2021-12-08 NOTE — PROGRESS NOTES
Problem: Pressure Injury - Risk of  Goal: *Prevention of pressure injury  Description: Document Brendon Scale and appropriate interventions in the flowsheet.   12/8/2021 1456 by [facility]  Outcome: Progressing Towards Goal  Note: Pressure Injury Interventions:  Sensory Interventions: Assess changes in LOC, Float heels, Keep linens dry and wrinkle-free         Activity Interventions: Pressure redistribution bed/mattress(bed type)    Mobility Interventions: Float heels, HOB 30 degrees or less    Nutrition Interventions: Document food/fluid/supplement intake, Offer support with meals,snacks and hydration    Friction and Shear Interventions: Apply protective barrier, creams and emollients, Feet elevated on foot rest, HOB 30 degrees or less, Minimize layers             12/8/2021 1235 by [facility]  Outcome: Progressing Towards Goal  Note: Pressure Injury Interventions:  Sensory Interventions: Assess changes in LOC, Float heels, Keep linens dry and wrinkle-free         Activity Interventions: Pressure redistribution bed/mattress(bed type)    Mobility Interventions: Float heels, HOB 30 degrees or less    Nutrition Interventions: Document food/fluid/supplement intake, Offer support with meals,snacks and hydration    Friction and Shear Interventions: Apply protective barrier, creams and emollients, Feet elevated on foot rest, HOB 30 degrees or less, Minimize layers                Problem: Patient Education: Go to Patient Education Activity  Goal: Patient/Family Education  12/8/2021 1456 by [facility]  Outcome: Progressing Towards Goal  12/8/2021 1235 by [facility]  Outcome: Progressing Towards Goal     Problem: Hospice Orientation  Goal: Demonstrate understanding of hospice philosophy, plan of care, and home hospice program  Description: The patient/family/caregiver will demonstrate understanding of hospice philosophy, plan of care and the home hospice program as evidenced by participation in meeting the patient's psychosocial, spiritual, medical, and physical needs inclusive of medical supplies/equipment focusing on symptoms. 12/8/2021 1456 by Jenifer Salmeron  Outcome: Progressing Towards Goal  12/8/2021 1235 by Jenifer Salmeron  Outcome: Progressing Towards Goal     Problem: Potential for Alteration in Skin Integrity  Goal: Monitor skin for areas of alteration in skin integrity  Description: Patient/family/caregiver will demonstrate ability to care for patient's skin, monitor for areas of breakdown, and demonstrate methods to prevent breakdown during hospice care. 12/8/2021 1456 by Jenifer Salmeron  Outcome: Progressing Towards Goal  12/8/2021 1235 by Jenifer Salmeron  Outcome: Progressing Towards Goal     Problem: Risk for Falls  Goal: Free of falls during inpatient stay  Description: Patient will be free of falls during inpatient stay. 12/8/2021 1456 by Jenifer Salmeron  Outcome: Progressing Towards Goal  12/8/2021 1235 by Jenifer Salmeron  Outcome: Progressing Towards Goal     Problem: Alteration in Mobility  Goal: Remain as independent as possible and remain safe in environment  Description: Patient will remain as independent as possible and remain safe in their environment. 12/8/2021 1456 by Jenifer Salmeron  Outcome: Progressing Towards Goal  12/8/2021 1235 by Jenifer Salmeron  Outcome: Progressing Towards Goal     Problem: Pain  Goal: Assess satisfaction of level of comfort and symptom control  12/8/2021 1456 by Jenifer Salmeron  Outcome: Progressing Towards Goal  12/8/2021 1235 by Jenifer Salmeron  Outcome: Progressing Towards Goal     Problem: Anticipatory Grief  Goal: Explore reactions to and verbalize acceptance of impending loss  Description: Patient/family/caregiver will explore reactions to and verbalize acceptance of impending loss.   12/8/2021 1456 by Jenifer Salmeron  Outcome: Progressing Towards Goal  12/8/2021 1235 by Jenifer Salmeron  Outcome: Progressing Towards Goal     Problem: Anxiety/Agitation  Goal: Verbalize or staff assess the ability to manage anxiety  Description: The patient/family/caregiver will verbalize and demonstrate ability to manage the patient's anxiety throughout hospice care. 12/8/2021 1456 by Soo Bloom  Outcome: Progressing Towards Goal  12/8/2021 1235 by Soo Bloom  Outcome: Progressing Towards Goal     Problem: Communication Deficit  Goal: Effectively communicate symptoms, needs, and concerns  Description: Patient/family/caregiver will effectively communicate symptoms, needs and concerns. 12/8/2021 1456 by Soo Bloom  Outcome: Progressing Towards Goal  12/8/2021 1235 by Soo Bloom  Outcome: Progressing Towards Goal     Problem: Coping and Emotional Distress  Goal: Demonstrate acceptance of terminal illness and understanding of disease progression  Description: Patient/family/caregiver will demonstrate acceptance of terminal disease and understanding of disease progression while employing appropriate coping mechanisms.   12/8/2021 1456 by Soo Bloom  Outcome: Progressing Towards Goal  12/8/2021 1235 by Soo Bloom  Outcome: Progressing Towards Goal

## 2021-12-08 NOTE — [facility] IDG SOCIAL WORKER NOTES
Patient: Everette Solomon    Date: 12/08/21  Time: 10:08 AM    [facility]  Notes  SW has read the initial comprehensive assessment and plan of care. No immediate needs noted. Pt needs an  for sign language and SW will ensure an  is available before completing assessment with pt, if applicable. Initial SW assessment visit will be completed within 5 days of admission.          Signed by: Hernan Encinas LMSW

## 2021-12-08 NOTE — PROGRESS NOTES
[id number] Daily received from [address]. Pt name and  identified. Pt in bed, resting with eyes closed. No signs or symptoms of pain, shortness of breath, anxiety , seizures or nausea/vomiting. FLACC 0/10. Comfort and safety measures in place. HOB elevated 30 degrees. Side rails up x2. Bed low and locked. Bed alarm tab in place. Call light in reach of patient, Door open for monitored visualization and hearing of patient. Care Plan reviewed and collaboration done with CNA. Pt expected to pass under GIP however will continue to assess change in LOC, medication & comfort needs, while collaborating with the Interdisciplinary Team.    7039: Peripheral line flushed. Pt resting quietly, resp even and non labored. No acute changes at present. No signs or symptoms of distress, pain, agitation, or discomfort noted. FLACC 0.    1030:  Patient resting quietly in bed with eyes closed. Respirations unlabored with no signs or symptoms of distress, pain, agitation, or discomfort noted. FLACC 0.    1234: Pt turned and floated with pillows. Pt not responsive with mvt and verbal stimuli. Both daughters present at the bedside. Updated on pt'a status. Emotional support given. .  1400: Scheduled Ativan  given per MAR. Pt remains restful, respirations even and nonlabored. No facial grimacing noted. FLACC 0/10. No additional sx's of pain, Dyspnea, Agitation or Nausea  to manage at this time. 1600: Patient resting quietly in bed with eyes closed. Respirations unlabored with no signs or symptoms of distress, pain, agitation, or discomfort noted. FLACC 0.      1715: Pt repositioned in bed onto opposite side. Pt remains non responsive. I/O and Vitals done and stable. No additional sx's of pain, Dyspnea, Agitation or Nausea  to manage at this time. 1817: Patient resting quietly in bed with eyes closed. Respirations unlabored with no signs or symptoms of distress, pain, agitation, or discomfort noted. FLACC 0.     Shift Report given and Molly Lee RN.

## 2021-12-09 NOTE — PROGRESS NOTES
Background:   Mendel R. Macel Memo is a 62year old female who has been living in a [facility] ( [address] [id number]). She comes to us after being at [facility]. In addition to her hospice diagnosis of metabolic encephalopathy, she has a history of bipolar disorder, schizophrenia Sepsis, aspiration, and pneumonia. Ms. Yong Del Rio has three children, Ronaldo Joya and Caitlyn. She has several grandchildren. She is a [employer]. Her [employer] is [employer].     Assessment:  During 's visit this morning patient was agitated and looked frightful. Her daughter Kadie Chauhan was at bedside attempting to soothe her but nothing was helping. ANA Isidro and Damián Garcia RN were all in the room attempting to help. Patient is deaf. Her daughter was signing for her but It was difficult for her to focus. Our medical director, Dr. Adarsh Bennett was close by.  notified him of patient's struggles. He assessed and developed additions to her treatment plan. Eventually she was settled. 3:00 PM  went back to the room and met with Natasha Zepeda, patient's son. He expressed his concerns for mom and for the family. He shared bits of their journey throughout life and how hard mom worked to make life as good as possible. She dealt with her deafness and the stigma surrounding what at that time was considered a handicap. Natasha Zepeda ask if there would be on going grief support for the family.  spoke of our bereavement program and assured him there would be support. Following our conversation  offered prayer.  provided contact information. Plan:  will continue to provide spiritual support as well as grief counseling.   will provide educational material.

## 2021-12-09 NOTE — PROGRESS NOTES
0900: BSSR received from [facility]. No family at the bedside at present. Pt name and  identified. Pt in bed, resting with eyes closed. No signs or symptoms of pain, shortness of breath, anxiety , seizures or nausea/vomiting. FLACC 0/10. Comfort and safety measures in place. HOB elevated 30 degrees. Side rails up x2. Bed low and locked. Bed alarm tab in place. Call light in reach of patient, Door open for monitored visualization and hearing of patient. Care Plan reviewed and collaboration done with CNA. Pt expected to pass under GIP however will continue to assess change in LOC, medication & comfort needs, while collaborating with the Interdisciplinary Team.    1058: PRN Morphine and Haldol given IV for pt agitation and restlessness. Pt moaning and crying out,  Dr Gable Cogan at the bedside evaluating pt and talking with pt's daughter Antonio Lim. Comfort and safety measures in place. 1115: Haldol 2 mg IV repeated as one time verbal order per Dr Gable Cogan for pt's agitation. Daughter Antonio Lim at bedside, attempting to console pt. BARBI Mak also at bedside. 1143: Pt given busy blanket and octopus  Along with Comfort bag per Alejo Mckinnon LMSW. Emotional support offered to daughter Antonio Lim. Pt starting to settle and quiet down after PRM medication. 1330: Pt observed on rounds, sleeping with eyes closed . Respirations even and nonlabored. No facial grimacing noted. No moaning or agitation. Flacc 0/10. No additional symptoms to manage at this time. Comfort and Safety measures maintained. Alarm exit tab in place. 1353: Scheduled Ativan IV given per MAR. Pt restless, swing legs  Over the side of the bed,Respirations even and nonlabored. No facial grimacing noted. FLACC 0/10. No additional sx's of pain, Dyspnea, Agitation or Nausea  to manage at this time. Pt's Son at bedside, Dave Hicks, update given. Pt calmed down soon after medicating. Mouth care done. Pt's eye open and \"clear\" , focusing on her son.  Son Johnny Hillman' with his mom. , while mom holding her mouth open. Small bits off applesauce offered, pt swallowed without difficulty, opening her mouth signaling \"more\". Pt  Ended up eating 1/2 cup of applesauce without swallowing issues, choking or cougfhing. Pt's visual clarity present, with purposeful reaction to her son's sign language. Son signed \"Jim, Home\" and \"I love you \" to his mother. Sera Cope, asked to speak with son for additional support although son seems to have a strong Spiritual acceptance of his mothers demise. 1619: Pt awake, holding mouth wide open~  Fed pt 2 pudding cups, yogurt cup and applesauce. Pt continues to swallow without difficulty, remains alert at the moment. Son ,Marichuy Vazquez, remains at the bedside \"signing\" with his mother, communicating. Pt not able to communicate back but will occasionally make eye contact. 1715: Pt fed another applesauce cup, keeps opening mouth \"or more\". Water soaked sponges placed in pt's mouth , sucking the water and swallowing without difficulty. 1741: PRN Morphine and Haldol IV given for pt for agitation and restlessness, pt swings legs between the  side rail, getting perpendicular in the bed. Pt moaning and crying out; observed pt after medicating, pt settling. Comfort and safety measures maintained. Tab Alert in place. 1854: PRN Morphine and Haldol IV repeated for continued agitation and restlessness, pt swings legs between the side rail, getting perpendicular in the bed. Pt moaning and crying out; Comfort and safety measures maintained. Tab Alert in place. 1850:  Pt moved to Room 107 for Fall Precautions. Family to be notified of room change. 1914: Geodon 5 mg IM in Left leg  Given for unresolved agitation per Edwige Reilly RN. Shift Report given to [facility]  RN.

## 2021-12-09 NOTE — [facility] IDG CHAPLAIN NOTES
Patient: Chacha Saul    Date: 12/09/21  Time: 2:18 PM    [facility]  Notes  / Grief Counselor has reviewed  Initial Comprehensive Assessment and plan of care. Bereavement and Spiritual Care Assessments to be completed and plan of care put in place to meet patient and family needs.          Signed by: Easton Fuentes

## 2021-12-09 NOTE — PROGRESS NOTES
Problem: Emotional Support Needs  Goal: Patient/family is receiving emotional support  Description: Pt, Caitlyn, and family will receive emotional support from SW weekly through weekly check-ins, education on the hospice philosophy, validation of feelings, rapport building, the use of a  if needed, and active listening throughout pt's hospice journey.    Outcome: Progressing Towards Goal

## 2021-12-09 NOTE — PROGRESS NOTES
Report received from off-going nurse,Bev Guillen RN, visual identification made, assumed care of pt. Pt resting quietly with eyes closed, no agitation or restlessness, no grimacing or groaning. Pt respirations unlabored. Tab alert in place, rails up x 2, bed in lowest position, safety maintained. FLACC 0. Oxygen on at 2 liters per nasal cannula. Pt on alternating air mattress.      0758 administered scheduled ativan IVP and flushed IV

## 2021-12-09 NOTE — PROGRESS NOTES
Problem: Anticipatory Grief    GOAL:  Mendel and her children, Raphael Chandler will demonstrate appropriate anticipatory grief reactions related to Espinoza's impending death as evidenced by   their ability to verbalize feelings associated with grief such as denial, bargaining, anger, and depression. They will display feelings and associated behaviors in a healthy manner during inpatient hospice stay.      Outcome: Progressing Towards Goal

## 2021-12-09 NOTE — PROGRESS NOTES
Problem: Pressure Injury - Risk of  Goal: *Prevention of pressure injury  Description: Document Brendon Scale and appropriate interventions in the flowsheet. Outcome: Progressing Towards Goal  Note: Pressure Injury Interventions:  Sensory Interventions: Assess changes in LOC, Float heels, Keep linens dry and wrinkle-free         Activity Interventions: Pressure redistribution bed/mattress(bed type)    Mobility Interventions: Float heels, HOB 30 degrees or less    Nutrition Interventions: Document food/fluid/supplement intake, Offer support with meals,snacks and hydration    Friction and Shear Interventions: Apply protective barrier, creams and emollients, Feet elevated on foot rest, HOB 30 degrees or less, Minimize layers                Problem: Hospice Orientation  Goal: Demonstrate understanding of hospice philosophy, plan of care, and home hospice program  Description: The patient/family/caregiver will demonstrate understanding of hospice philosophy, plan of care and the home hospice program as evidenced by participation in meeting the patient's psychosocial, spiritual, medical, and physical needs inclusive of medical supplies/equipment focusing on symptoms. Outcome: Progressing Towards Goal     Problem: Potential for Alteration in Skin Integrity  Goal: Monitor skin for areas of alteration in skin integrity  Description: Patient/family/caregiver will demonstrate ability to care for patient's skin, monitor for areas of breakdown, and demonstrate methods to prevent breakdown during hospice care. Outcome: Progressing Towards Goal     Problem: Risk for Falls  Goal: Free of falls during inpatient stay  Description: Patient will be free of falls during inpatient stay. Outcome: Progressing Towards Goal     Problem: Alteration in Mobility  Goal: Remain as independent as possible and remain safe in environment  Description: Patient will remain as independent as possible and remain safe in their environment.   Outcome: Progressing Towards Goal     Problem: Pain  Goal: Assess satisfaction of level of comfort and symptom control  Outcome: Progressing Towards Goal  Goal: *Control of acute pain  Outcome: Progressing Towards Goal     Problem: Anticipatory Grief  Goal: Explore reactions to and verbalize acceptance of impending loss  Description: Patient/family/caregiver will explore reactions to and verbalize acceptance of impending loss. Outcome: Progressing Towards Goal     Problem: Anxiety/Agitation  Goal: Verbalize or staff assess the ability to manage anxiety  Description: The patient/family/caregiver will verbalize and demonstrate ability to manage the patient's anxiety throughout hospice care. Outcome: Progressing Towards Goal     Problem: Communication Deficit  Goal: Effectively communicate symptoms, needs, and concerns  Description: Patient/family/caregiver will effectively communicate symptoms, needs and concerns. Outcome: Progressing Towards Goal     Problem: Breathing Pattern - Ineffective  Goal: *Use of effective breathing techniques  Outcome: Progressing Towards Goal     Problem: Grieving  Goal: *Able to express feelings of grief  Outcome: Progressing Towards Goal     Problem: Nausea/Vomiting (Adult)  Goal: *Absence of nausea/vomiting  Outcome: Progressing Towards Goal     Problem: Infection - Risk of, Central Venous Catheter-Associated Bloodstream Infection  Goal: *Absence of infection signs and symptoms  Outcome: Progressing Towards Goal     Problem: Infection - Risk of, Urinary Catheter-Associated Urinary Tract Infection  Goal: *Absence of infection signs and symptoms  Outcome: Progressing Towards Goal     Problem: End of Life Process  Goal: Demonstrate understanding of end of life processes  Description: Patient/caregiver will understand end of life processes.   Outcome: Progressing Towards Goal     Problem: Dyspnea Due to End of Life  Goal: Demonstrate understanding of and ability to manage respiratory symptoms at end of life  Outcome: Progressing Towards Goal     Problem: Imminent Death  Goal: Collaborate with patient/family/caregiver/interdisciplinary team to minimize and manage end of life symptoms  Outcome: Progressing Towards Goal     Problem: Falls - Risk of  Goal: *Absence of Falls  Description: Document Rekha Phipps Fall Risk and appropriate interventions in the flowsheet.   Outcome: Progressing Towards Goal

## 2021-12-09 NOTE — PROGRESS NOTES
Demographics      Information provided by: Pt's daughter Prakash Li. Pt was awake and moaning and grimacing. Pt had just been medicated by [address] and Dr. Ashley Guaman had been in to assess her. Pt had moments where she appeared to focus on SWs face and would smile. Name: Emma Lock                                                                  Level of Care  GIP [x] Routine  [] Respite   []           From the in home program Yes [] No [x]                                                        Diagnosis: Metabolic Encephalopathy         Insurance    Medicare []   Medicaid  [x]  Blue Cross  []      Other []                Social    []   Single [x]     []    []     Children: Three- two daughters and one son. One of pt's daughter's is also deaf. Community Resources Used in the Home prior to admission: Yes [x]  No []    [employer] Needed? Yes []  No [x]    If yes, explain: Pt was living at [facility] (This facility has changed names a few times). Financial Concerns: There are no reported financial concerns at this time. SW discussed the IFA with Caitlyn and the family will need help locating a bed in a placement facility if pt changes LOC and can be placed in a long term facility. SW will support the family in this if applicable. [place] Application Needed   Yes []  No [x]     Medicaid application needed Yes []  No [x]                                    IFA Form Complete  Yes [x]   No []     Discharge Plans: Plans are to remain [facility] for GIP and comfort care.             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          Final Arrangements: The family will be using [employer]/ [facility]. Medical History and/or Narrative copied from the patients chart from the [address] [place] [address] Physician or Rn:  [facility] Nurse Notes-  Rian Brink, 62 y.o. F. Arrived from [facility] to room 101 at the [facility]. Pt medicated prior to transfer with Morphine 2 mg IVP. Pt admitted with DX: Sepsis and aspiration pneumonia. LOC: GIP. Pt is a DNR. Pt transferred to bed. Pt repositioned. Pt with eyes opened. Non verbal. No facial grimace. Flacc =0-1. Resp irreg non labored on 2 L n/c. Lungs with bilateral rales. HR irreg. BS active. No edema noted at this time. Melo cath draining orange clear urine. BLE with contractures. Allevyn dressings no rachel  Areas and back for protection. Peripheral line flushed with 3 ml ns. Flushed well. Dressing clean/dry/intact. Admission complete and initial General Hospice care initiated which includes spiritual,psychosocial, bereavement, Md, and IDG team. IDG team made aware of plan of care and immediate needs. Pt is under [facility] level of care. Will continue to assess need for change of level of care. Collaborate with IDG team regarding discharge planning. Pt is expected to  under [facility] level of care. Reviewed care plan with CNA. Mental Health History: There are reports from [address] [place] [address] and [facility] that pt is bi-polar and schizophrenic which they learned from [facility]. Volunteer discussion: Yes []    No [x]  **Due to COVID-19 protocols. Goals of care for the patient and family: To keep pt comfortable and to meet pt and family needs. Coping and Bereavement: Caitlyn was tearful throughout this assessment on and off. She discussed her mother's care at the facility she was at previously and expressed some anger and upset about how pt was treated. SW provided emotional support. SW explained SWs role and encouraged Caitlyn to reach out with any needs. SW will continue to assess for coping and bereavement needs. Was a Referral made to Bereavement  Yes [] No  [x]    Support Needs: There are no reported support needs at this time. Caitlyn reports that she and her sister live in the [place] area and their brother lives in [place] so everyone is close and supportive of each other. SW will provide emotional support to the family and pt weekly and assess for needs.

## 2021-12-09 NOTE — PROGRESS NOTES
1945  Received report from off going RN  Identified pt by name and date of birth. GIP level of care admitted for hospice dx of Sepsis and Aspiration Pneumonia Hospice is  to manage pain agitation and dyspnea   Pt plans to remain at [facility] [place] until passing. LOC will be reviewed often for signs of change in LOC   Plan of Care was reviewed. Safety measures such as tab alert,  bed in low/locked position , side rails x 3, door open  are in place. Pt has Melo draining clear yellow urine. O2 at 2L per nasal canula. Patients family requesting that pt be allowed to have liquids/and or food  Discussed with pts family that pt was here for aspiration pneumonia   Family felt that pt was indicating to them that she was thirsty   Educated family on the pink swabs and also provided pt with glycerin swabs. Pt is able to suck the liquid off of an oral  sponge . 2056  Scheduled Ativan given to pt. Pt resting quietly in bed. Large number of family in the room visiting with pt.        65  Family had left and pt was attempting to get out of the bed. Pt resisting being placed back in the bed. PRN Geodon 5mg IM  was given for agitation     2250  Assisted CNA with repositioning pt.     0006   Pt resting quietly with eyes closed. No signs of distress noted   All safety measures in place. 0200  Pt resting quietly in bed with eyes closed. No signs of distress noted. All safety measures in place. )200 scheduled dose of Ativan held due to use of Geodon. Pt is very sedated     0416  Pt resting quietly in bed with eyes closed. All safety measures in place    0610  Pt resting quietly with eyes closed. No signs of distress noted. All safety measures in place.      Report given to oncoming RN

## 2021-12-09 NOTE — PROGRESS NOTES
Problem: Pressure Injury - Risk of  Goal: *Prevention of pressure injury  Description: Document Brendon Scale and appropriate interventions in the flowsheet. Outcome: Progressing Towards Goal  Note: Pressure Injury Interventions:  Sensory Interventions: Assess changes in LOC, Assess need for specialty bed, Float heels, Keep linens dry and wrinkle-free    Moisture Interventions: Absorbent underpads, Check for incontinence Q2 hours and as needed, Internal/External urinary devices    Activity Interventions: Assess need for specialty bed    Mobility Interventions: Float heels, HOB 30 degrees or less    Nutrition Interventions: Document food/fluid/supplement intake, Offer support with meals,snacks and hydration    Friction and Shear Interventions: Apply protective barrier, creams and emollients, Foam dressings/transparent film/skin sealants, HOB 30 degrees or less, Minimize layers                Problem: Patient Education: Go to Patient Education Activity  Goal: Patient/Family Education  Outcome: Progressing Towards Goal     Problem: Hospice Orientation  Goal: Demonstrate understanding of hospice philosophy, plan of care, and home hospice program  Description: The patient/family/caregiver will demonstrate understanding of hospice philosophy, plan of care and the home hospice program as evidenced by participation in meeting the patient's psychosocial, spiritual, medical, and physical needs inclusive of medical supplies/equipment focusing on symptoms. Outcome: Progressing Towards Goal     Problem: Potential for Alteration in Skin Integrity  Goal: Monitor skin for areas of alteration in skin integrity  Description: Patient/family/caregiver will demonstrate ability to care for patient's skin, monitor for areas of breakdown, and demonstrate methods to prevent breakdown during hospice care.   Outcome: Progressing Towards Goal     Problem: Alteration in Mobility  Goal: Remain as independent as possible and remain safe in environment  Description: Patient will remain as independent as possible and remain safe in their environment. Outcome: Progressing Towards Goal     Problem: Pain  Goal: Assess satisfaction of level of comfort and symptom control  Outcome: Progressing Towards Goal     Problem: Anticipatory Grief  Goal: Explore reactions to and verbalize acceptance of impending loss  Description: Patient/family/caregiver will explore reactions to and verbalize acceptance of impending loss. Outcome: Progressing Towards Goal     Problem: Anxiety/Agitation  Goal: Verbalize or staff assess the ability to manage anxiety  Description: The patient/family/caregiver will verbalize and demonstrate ability to manage the patient's anxiety throughout hospice care. Outcome: Progressing Towards Goal     Problem: Communication Deficit  Goal: Effectively communicate symptoms, needs, and concerns  Description: Patient/family/caregiver will effectively communicate symptoms, needs and concerns. Outcome: Progressing Towards Goal     Problem: Coping and Emotional Distress  Goal: Demonstrate acceptance of terminal illness and understanding of disease progression  Description: Patient/family/caregiver will demonstrate acceptance of terminal disease and understanding of disease progression while employing appropriate coping mechanisms.   Outcome: Progressing Towards Goal Mercedes Flap Text: The defect edges were debeveled with a #15 scalpel blade.  Given the location of the defect, shape of the defect and the proximity to free margins a Mercedes flap was deemed most appropriate.  Using a sterile surgical marker, an appropriate advancement flap was drawn incorporating the defect and placing the expected incisions within the relaxed skin tension lines where possible. The area thus outlined was incised deep to adipose tissue with a #15 scalpel blade.  The skin margins were undermined to an appropriate distance in all directions utilizing iris scissors.

## 2021-12-09 NOTE — H&P
History and Physical    Patient: Javier Yates MRN: [id number]  SSN: [id number]    YOB: 1960  Age: 62 y.o. Sex: female      Subjective:      Mendel Gwinda Quan is a 62 y.o. female who has a hospice diagnosis of metabolic encephalopathy. Hospice associated diagnoses are aspiration pneumonia (RLL), dysphagia, AMS, fever, sepsis, hypoxic respiratory failure, leukocytosis, hypernatremia, hypoalbuminemia, strict NPO due to failed swallow evaluation, debility, severe protein calorie malnutrition, cachexia, dementia with behavior disturbance, bipolar disorder, schizophrenia and deafness. Non-associated diagnoses are depression, HTN, HLD, and hypothyroidism. She presented from [address] to the [facility] ER on 12/3 with fever. The family revoked hospice care at the facility for evaluation and treatment of fever and hypoxia at the ER. She was tachycardic and febrile on arrival. She was also hypotensive with BP 84/55. Lab studies revealed leukocytosis with WBC 18.1, hypernatremia with sodium 149 and elevated procalcitonin at 0.31. Chest xray revealed right basilar opacity indicating aspiration pneumonia. Covid-19/Flu A&B/RSV were negative. CT of the head was negative. She was started on IV fluids and IV antibiotics with Cefepime and Zosyn. She was treated per sepsis protocol. Aspiration pneumonia was thought to be caused by chronic dysphagia from dementia. Swallowing study was completed and she was made strict NPO due to aspiration of all consistencies. She has severe protein calorie malnutrition with BMI 13 with severe cachexia. After 4 days of treatment with no improvement in her encephalopathic state, her family opted to stop aggressive measures. Her family did not want a NG or permanent feeding tube placed. Sepsis has resolved but aspiration pneumonia is likely to return especially with comfort feeding.  Due to her continued decline, her family has elected to forgo further medical treatment and pursue comfort measures with hospice care. Without further treatment, her life expectancy is less than 10 days. She is unable to take in food, fluids or medications orally and will require parenteral medications for symptom management. Patient admitted GIP with metabolic encephalopathy for management of pain, dyspnea, agitation. Past Medical History:   Diagnosis Date    Anxiety     Asthma     controlled with inhalers    Atrophic vaginitis 2017    Deaf     Deafness since age 4    ASL-   Gloriajean Seeds Depression     Hypercholesterolemia     Hypertension     Hypothyroid     Stool color black     diarrhea    Vision problems      Past Surgical History:   Procedure Laterality Date    HX  SECTION      HX COLONOSCOPY      HX FRACTURE TX  5-6 yrs ago    right arm with pinning    HX ORTHOPAEDIC  2005    R wrist with Hardware    HX ORTHOPAEDIC  14    R wrist surgery, she broke it and they put a metal plate    HX TUBAL LIGATION        Family History   Problem Relation Age of Onset    Heart Disease Mother     Breast Cancer Mother 48    Heart Disease Father     Cancer Maternal Grandmother     No Known Problems Maternal Grandfather     No Known Problems Paternal Grandmother     No Known Problems Paternal Grandfather     Colon Cancer Neg Hx      Social History     Tobacco Use    Smoking status: Never Smoker    Smokeless tobacco: Never Used   Substance Use Topics    Alcohol use: No     Alcohol/week: 0.0 standard drinks      Prior to Admission medications    Medication Sig Start Date End Date Taking? Authorizing Provider   benztropine (COGENTIN) 2 mg tablet Take 1 mg by mouth nightly. Provider, Historical   clonazePAM (KlonoPIN) 0.5 mg tablet Take 0.5 mg by mouth three (3) times daily. Provider, Historical   mirtazapine (Remeron) 15 mg tablet Take 15 mg by mouth nightly. Provider, Historical   QUEtiapine (SEROqueL) 50 mg tablet Take 150 mg by mouth two (2) times a day.     Provider, Historical   senna (Senna) 8.6 mg tablet Take 2 Tablets by mouth nightly. Provider, Historical   acetaminophen (TylenoL) 325 mg tablet Take 650 mg by mouth every six (6) hours as needed for Pain. Provider, Historical   guaiFENesin (ROBITUSSIN) 100 mg/5 mL liquid Take 200 mg by mouth every four (4) hours as needed for Cough. Provider, Historical   melatonin 5 mg tablet Take 5 mg by mouth nightly as needed (sleep). Provider, Historical   ondansetron hcl (ZOFRAN) 4 mg tablet Take 4 mg by mouth every six (6) hours as needed for Nausea or Vomiting. Provider, Historical   polyethylene glycol (Miralax) 17 gram packet Take 17 g by mouth daily as needed for Constipation. Provider, Historical   montelukast (SINGULAIR) 10 mg tablet TAKE 1 TABLET IN THE EVENING FOR ALLERGIES. 11/10/16   Brigette Cheatham MD        No Known Allergies    Review of Systems:  Review of systems not obtained due to patient factors. Objective:     Vitals:    12/07/21 2145 12/07/21 2200 12/08/21 0430 12/08/21 1717   BP: 134/77  122/66 100/71   Pulse: 69  (!) 53 97   Resp: 14  9 10   Temp: 98.5 °F (36.9 °C)  (!) 96.1 °F (35.6 °C) 98.1 °F (36.7 °C)   Weight:  38.6 kg (85 lb)     Height:  5' 2\" (1.575 m)          Physical Exam:  GENERAL: mild distress, appears older than stated age, pale, cachectic, lethargic, restless  LUNG: Coarse breath sounds, diminished in the right lower lobe. Unlabored respirations. HEART: regular rate and rhythm  ABDOMEN: soft, non-tender. Bowel sounds hypoactive. : Melo catheter with debbie urine. EXTREMITIES:  extremities with no cyanosis or edema. + pulses. SKIN: Pale. Warm to touch. Peripheral IV in left forearm with dressing intact. NEUROLOGIC: Lethargic, restless. Nonverbal. Generalized weakness. Bedbound.    PSYCHIATRIC: agitated    Assessment:     Hospital Problems  Date Reviewed: 9/6/2017          Codes Class Noted POA    Dementia in chronic schizophrenia ([facility] ICD-10-CM: F20.5, F02.80  ICD-9-CM: 295.62, 294.10  12/10/2021 Yes        Dehydration, moderate ICD-10-CM: E86.0  ICD-9-CM: 276.51  12/10/2021 Yes        Senile debility ICD-10-CM: R54  ICD-9-CM: 243  12/10/2021 Yes        Severe protein-calorie malnutrition ([facility] ICD-10-CM: E43  ICD-9-CM: 846  12/10/2021 Yes        * (Principal) Metabolic encephalopathy OIB-44-XQ: G93.41  ICD-9-CM: 348.31  12/3/2021 Yes        Aspiration pneumonia ([facility] ICD-10-CM: J69.0  ICD-9-CM: 507.0  12/3/2021 Unknown        Deaf, requires  (Chronic) ICD-10-CM: H91.90  ICD-9-CM: 389.9  6/12/2014 Yes              Plan:     Current Facility-Administered Medications   Medication Dose Route Frequency    ziprasidone (GEODON) 5 mg in sterile water (preservative free) 0.25 mL injection  5 mg IntraMUSCular Q6H PRN    LORazepam (ATIVAN) injection 0.5 mg  0.5 mg IntraVENous Q6H    sodium chloride (NS) flush 3 mL  3 mL IntraVENous Q12H    sodium chloride (NS) flush 3 mL  3 mL IntraVENous PRN    haloperidol lactate (HALDOL) injection 2 mg  2 mg SubCUTAneous Q1H PRN    Or    haloperidol lactate (HALDOL) injection 2 mg  2 mg IntraVENous Q1H PRN    acetaminophen (TYLENOL) suppository 650 mg  650 mg Rectal Q3H PRN    bisacodyL (DULCOLAX) suppository 10 mg  10 mg Rectal PRN    haloperidol lactate (HALDOL) injection 2 mg  2 mg SubCUTAneous Q1H PRN    Or    haloperidol lactate (HALDOL) injection 2 mg  2 mg IntraVENous Q1H PRN    glycopyrrolate (ROBINUL) injection 0.2 mg  0.2 mg IntraVENous Q4H PRN    morphine injection 2 mg  2 mg IntraVENous Q20MIN PRN    Or    morphine injection 2 mg  2 mg SubCUTAneous Q20MIN PRN       12/7: (Jocy) Admitted GIP with metabolic encephalopathy for management of pain, dyspnea, agitation. 1. Pain: Morphine 2mg IV/SQ Q20 minutes as needed. 2. Dyspnea: Morphine 2mg IV/SQ Q20 minutes as needed. Glycopyrrolate 0.2mg q4 prn secretions. Oxygen at 2 L/min prn.    3. Agitation: Haloperidol 2mg IV/SQ Q1 hour prn. Geodon 5mg IM Q4 prn.     4. Family/Pt Support: Family at bedside during exam. Medications and plan of care discussed with nursing staff and family. Will continue to monitor for symptoms and adjust medications as needed to maintain patient comfort. PPS 10%. Case discussed with Dr. Fariha Macias. Add scheduled Lorazepam 1mg IV/IM q6.     Signed By: Fazal Mohamud NP     December 8, 2021

## 2021-12-10 PROBLEM — E86.0 DEHYDRATION, MODERATE: Status: ACTIVE | Noted: 2021-01-01

## 2021-12-10 PROBLEM — E43 SEVERE PROTEIN-CALORIE MALNUTRITION (HCC): Status: ACTIVE | Noted: 2021-01-01

## 2021-12-10 PROBLEM — F20.5 DEMENTIA IN CHRONIC SCHIZOPHRENIA (HCC): Status: ACTIVE | Noted: 2021-01-01

## 2021-12-10 PROBLEM — R54 SENILE DEBILITY: Status: ACTIVE | Noted: 2021-01-01

## 2021-12-10 PROBLEM — F02.80 DEMENTIA IN CHRONIC SCHIZOPHRENIA (HCC): Status: ACTIVE | Noted: 2021-01-01

## 2021-12-10 PROBLEM — J69.0 ASPIRATION PNEUMONIA (HCC): Status: ACTIVE | Noted: 2021-01-01

## 2021-12-10 NOTE — PROGRESS NOTES
190 Bedside report received from Enrique Morel RN. Pt identified by name and . Pt admitted on 2021 with a diagnosis of Sepsis and aspiration pneumonia under [facility] care. Pt alert with extreme confusion. Complete care. Emlo catheter in place draining yellow urine. Pt restless and moaning loudly. RR shallow and non labored. No NVD or SOB. FLACC 6/10. Bed in lowest and locked position with all safety measures in place. Pt is at [facility] level of care, no change to pt discharge plan. Pt to stay at [facility] until passing. Plan of care reviewed with CNA.  Pt given PRN Geodon to manage anxiety, agitation, and restlessness. RR shallow and non labored on room air. No signs of NVD or SOB. FLACC 6/10. Will continue to monitor for changes.  Pt resting comfortably in bed; no signs of pain or distress noted. RR even and non labored. No signs of NVD or SOB. FLACC 0/10. Will continue to monitor.  Pt given scheduled medication as per orders. Nurse noted facial grimacing and pt given PRN Morphine to manage pain. RR even and non labored. No signs of NVD or SOB. FLACC 0/10. Will continue to monitor.  Pt resting comfortably in bed; no signs of pain or distress noted. RR even and non labored. No signs of NVD or SOB. FLACC 0/10. Will continue to monitor. Pt perpendicular in bed; pt repositioned for comfort and safety. 232 Pt resting quietly in bed; no signs of pain or distress noted. RR even and non labored. No signs of NVD or SOB. FLACC 0/10. Will continue to monitor. 0104 Pt given scheduled Lorazepam as ordered. Pt resting quietly in bed; no signs of pain or distress noted. RR even and non labored. No signs of NVD or SOB. FLACC 0/10. Will continue to monitor. 0339 Pt resting comfortably in bed; no signs of pain or distress noted. RR even and non labored. No signs of NVD or SOB. FLACC 0/10. Will continue to monitor. Pt perpendicular in bed; pt repositioned for comfort and safety.         0517 Pt medicated for comfort prior to morning bath; PRN Haldol and Morphine given to manage symptoms. Pt tolerated care without complaint. Pt resting quietly in bed; no signs of pain or distress noted. RR even and non labored. No signs of NVD or SOB. FLACC 0/10. Will continue to monitor. 0606 Pt resting quietly in bed; no signs of pain or distress noted. RR even and non labored. No signs of NVD or SOB. FLACC 0/10. Will continue to monitor. Bedside report given to Luis Khalil RN.

## 2021-12-10 NOTE — [facility] IDG CHAPLAIN NOTES
Patient: Anne-Marie Camarena    Date: 12/10/21  Time: 11:17 AM    [facility]  Notes  Intervention: Ministry of presence, initial assessments completed, family care with active listening and education, compassion and prayer. Coordination with medical director for urgent intervention due to not being able to calm patient. Prayer, provided \" Gone From My Sight\". Provided bereavement information. Identified Ann-Marie Tradition. Outcome:   Conversation with family. Medical Director came to room assessed and adjusted medication. Family expressed appreciation for support. Son inquired about bereavement support. Plan: Continue to provide spiritual and emotional support. GOAL: Mendel and her children, Augusta Edmonds will demonstrate appropriate anticipatory grief reactions related to Espinoza's impending death as evidenced by their ability to verbalize feelings associated with grief such as denial, bargaining, anger, and depression. They will display feelings and associated behaviors in a healthy manner during inpatient hospice stay. Interventions:  will assess grief reactions with each visit.  will provide opportunities for meaningful, nonjudgmental conversations and education.                 Signed by: Mima Pisano

## 2021-12-10 NOTE — PROGRESS NOTES
0700-report received from [facility]. Patient remains under GIP level of care for management of pain and agitation/restlessness. Patient found sleeping comfortably on right side. Does not respond to touch with eye opening or sounds. RR 12, unlabored. Safety measures include tab alarms, bed in l/l position, SR X3, door open. CNA verbalized understanding for today's POC.    0831-scheduled Ativan given. Patient is lethargic, partially opens eyes. Eyes are red. Does not attempt to move independently or make sounds to communicate. Not alert enough to attempt PO; unsafe. Assessment complete. Several reddened areas to bony prominences covered with Allevyn for protection. Reposition to left side. Melo draining. IV flushes well. Report given to provider, Michael Brown NP.     1130-continues to be lethargic and rest comfortably. No restless movements or moaning. No pain observed. Adult children at bedside visiting. 1404-scheduled Ativan given. Continues to be drowsy but rest quietly and comfortably. Eyes do open with repositioning. Remains curled into fetal position with arms and legs tightly contracted inward. Pillow between knees. Several questions answered for adult children at bedside. Children asking Frankey Bills you all rechecked her for pneumonia since she's been here? \" and also stating \"I don't know if I want her on haldol, it's a powerful drug and so is morphine. \"  Education regarding hospice philosophy, medications, and end of life expectations reviewed with children. Daughter states Padmini Vivar looks like mom. This is the best she's looked in a a long time. \"       1600-son requesting for medication record to be printed. Educated son on appropriate channels to obtain patient medical records and he became angry. Asking Melba Varela are you trying to hide from me\" discussed at length, patient's scheduled and PRN medications and the reason for administration on all medications.  Son becomes more angry and argumentative stating \"I thought we came here for a natural death, not so you could drug her. I don't want her getting addicted to medications. \" attempts to educate on comfort directed care at end of life (including management of severe agitation) are met with resistance. Stating \" do I just not get to say what medicines she is on?\"  Would not accept end of life journey education pamphlet. Stormed hastily to the porch and stated he could no longer speak with me. This nurse left to discuss situation with provider, HAMMAD lanza, and on my return to the room, son was found to be gone. Patient is resting quietly and calmly. Eyes open to stimulus. Does not attempt to move independently; not making noises or moaning. All safety measures in place and door open. 1804-repositioned to left side. Eyes open and makes subtle quiet sounds during repositioning. Calm and quiet when left alone. Not moving in bed independently. Does not make eye contact or track. Falls back asleep quickly. Unable to take PO safely this shift r/t lethargy. All safety measures remain in place. Report given to JACOB Akbar.

## 2021-12-10 NOTE — [facility] IDG MASTER NOTE
[facility]  Date: 12/10/21  Time: 1:25 PM    ___________________    Patient: Selam Zapata  Coverage Information:     Payor: [place]: [place] MEDICA[place]     Subscriber ID: [id number]     Phone Number:   MRN: [id number]  CCN:   [place] Claim No. :     Hospice Election Date:   Current Benefit Period: Benefit Period 1  Start Date: 12/7/2021  End Date: 3/6/2022      Medical Director:   Hospice Attending Provider: Sebastian Rivero MD  [address]  [address]  Phone: [phone]  Fax: [phone]    Level of Care: General Inpatient Care    IDG NOTE     12/7: (Jocy) Admitted GIP with metabolic encephalopathy for management of pain, dyspnea, agitation. (Was a resident at [facility])     Subjective:   Confused, yelling out. Agitated. Moved closer to nurses station last night for closer observation due to trying to get out of bed.      Intake:  Pleasure diet: 2 pudding cups, 2.5 applesauce cups, 1 yogurt cup yesterday.  No breakfast this morning.      Scheduled medications:         Current Facility-Administered Medications   Medication Dose Route Frequency    haloperidoL (HALDOL) tablet 5 mg  5 mg Oral TID     Or    haloperidol (HALDOL) 2 mg/mL oral solution 5 mg  5 mg Oral TID    LORazepam (ATIVAN) injection 0.5 mg  0.5 mg IntraVENous Q6H    sodium chloride (NS) flush 3 mL  3 mL IntraVENous Q12H         PRN medications/symptoms:  Haldol 2mg x 3 and 4mg IV x 1 for agitation  Geodon 5mg IM x 1 for agitation  Morphine 2mg IV x 5 for pain     Wounds:  N/A     Output: Melo catheter 600  ml urine output     IV access: Peripheral IV LFA     Oxygen:  Room air     Patient Vitals for the past 24 hrs:    Temp Pulse Resp BP   12/10/21 0830 -- -- 12 --   12/10/21 0700 -- -- 12 --   12/10/21 0545 (!) 96.4 °F (35.8 °C) (!) 51 (!) 7 (!) 86/56   12/09/21 1550 (!) 96.6 °F (35.9 °C) (!) 119 16 (!) 133/101         Changes in plan of care:     New patient: Comprehensive plan of care reviewed. IDG and pt./family in agreement with plan of care. The IDG identifies through on-going assessment when a change is needed to the POC; the pt/family will receive care and services necessitated by changes in POC. Medications reviewed by the pharmacist and Medical Director.      Case discussed with Dr. Elvie Molina and in [facility]. DC scheduled haldol and add haldol 4mg IV q8 or Haldol 5mg po Q8. Remains appropriate for GIP level of care.      Abraham BARRERA, NP-C  12/10/21    ___________________    Diagnoses: There were no encounter diagnoses.     Current Medications:    Current Facility-Administered Medications:     haloperidol lactate (HALDOL) injection 4 mg, 4 mg, IntraVENous, Q8H **OR** haloperidol (HALDOL) 2 mg/mL oral solution 5 mg, 5 mg, SubLINGual, Q8H, Anderson Richardson NP    haloperidol lactate (HALDOL) injection 4 mg, 4 mg, IntraMUSCular, Q2H PRN **OR** haloperidol lactate (HALDOL) injection 4 mg, 4 mg, IntraVENous, Q2H PRN, Shayy Zhao MD, 4 mg at 12/10/21 0517    ziprasidone (GEODON) 5 mg in sterile water (preservative free) 0.25 mL injection, 5 mg, IntraMUSCular, Q6H PRN, Shayy Zhao MD, 5 mg at 12/09/21 1914    LORazepam (ATIVAN) injection 0.5 mg, 0.5 mg, IntraVENous, Q6H, Perry Drain, NP, 0.5 mg at 12/10/21 0831    sodium chloride (NS) flush 3 mL, 3 mL, IntraVENous, Q12H, Ebony Drain, NP, 3 mL at 12/10/21 0831    sodium chloride (NS) flush 3 mL, 3 mL, IntraVENous, PRN, Ebony Drain, NP, 3 mL at 12/10/21 0517    acetaminophen (TYLENOL) suppository 650 mg, 650 mg, Rectal, Q3H PRN, Ebony Drain, NP    bisacodyL (DULCOLAX) suppository 10 mg, 10 mg, Rectal, PRN, Ebony Drain, NP    glycopyrrolate (ROBINUL) injection 0.2 mg, 0.2 mg, IntraVENous, Q4H PRN, Perry Drain, NP    morphine injection 2 mg, 2 mg, IntraVENous, Q20MIN PRN, 2 mg at 12/10/21 0517 **OR** morphine injection 2 mg, 2 mg, SubCUTAneous, Q20MIN PRN, Khoa Juárez NP    Orders:  Orders Placed This Encounter    IP CONSULT TO SPIRITUAL CARE Once on week one, then PRN. For [facility] patients only. For contracted patients, primary hospice will continue to manage spiritual care needs     Once on week one, then PRN. For [facility] patients only. For contracted patients, primary hospice will continue to manage spiritual care needs     Standing Status:   Standing     Number of Occurrences:   1     Order Specific Question:   Reason for Consult: Answer:   Spiritual crisis intervention or per patient or caregiver request    DIET PLEASURE     Standing Status:   Standing     Number of Occurrences:   1    NURSING-MISCELLANEOUS: DME: Please order and place air mattress for pressure reduction. CONTINUOUS     Please order and place air mattress for pressure reduction. Standing Status:   Standing     Number of Occurrences:   1     Order Specific Question:   Description of Order:     Answer:   DME:    VITAL SIGNS     Standing Status:   Standing     Number of Occurrences:   1    VITAL SIGNS     Standing Status:   Standing     Number of Occurrences:   1    NURSING-MISCELLANEOUS: comfort measures Enter comfort measures above. CONTINUOUS     Enter comfort measures above. Standing Status:   Standing     Number of Occurrences:   1     Order Specific Question:   Description of Order:     Answer:   comfort measures    PRAJAPATI CATHETER, CARE     1. Prajapati Catheter care every shift and PRN  2. Notify Physician of Prajapati Catheter leakage, occlusion, gross adherent sediment or accidental removal  3. Change Prajapati 30 days after insertion. 4. May flush catheter prn leakage or gross adherent sediment or mucus.      Standing Status:   Standing     Number of Occurrences:   1    BLADDER CHECKS     May scan bladder PRN for urinary retention and or patient discomfort     Standing Status:   Standing     Number of Occurrences:   1    NURSING ASSESSMENT: SPECIFY Assess for GIP, routine, or respite level of care. Q SHIFT Routine     Standing Status:   Standing     Number of Occurrences:   1     Order Specific Question:   Please describe the test or procedure you would like to order. Answer:   Assess for GIP, routine, or respite level of care.  PAIN ASSESSMENT Pain and Symptoms: Assess ever 4 hours and PRN, for GIP level of care. PRN Routine     Standing Status:   Standing     Number of Occurrences:   1     Order Specific Question:   Please describe the test or procedure you would like to order. Answer:   Pain and Symptoms: Assess ever 4 hours and PRN, for GIP level of care.  BEDREST, COMPLETE     Standing Status:   Standing     Number of Occurrences:   1    NURSING-MISCELLANEOUS: admit 12/7: (Jocy) Admitted GIP with metabolic encephalopathy for management of pain, dyspnea, agitation and wound care. Associated Dx:   Aspiration PNA RLL, dysphagia, AMS, fever, sepsis, hypoxic resp. failure, leukocytos. ..     12/7: (Jocy) Admitted GIP with metabolic encephalopathy for management of pain, dyspnea, agitation and wound care. Associated Dx:   Aspiration PNA RLL, dysphagia, AMS, fever, sepsis, hypoxic resp. failure, leukocytosis, hypernatremia, hypoalbuminemia, strict NPO due to failed swallow evaluation, debility, severe protein calorie malnutrition, dementia with behavior disturbance, and deafness. Non Associated Dx: Bipolar disorder, depression, HTN, HLD, and hypothyroidism. Standing Status:   Standing     Number of Occurrences:   1     Order Specific Question:   Description of Order:     Answer:   admit    NURSING-MISCELLANEOUS: Peripheral IV access: 1. May use peripheral IV if present. If no IV access present, may place peripheral IV for medication administration. 2. Maintain IV access per hospital policy. CONTINUOUS     1. May use peripheral IV if present. If no IV access present, may place peripheral IV for medication administration.    2. Maintain IV access per hospital policy. Standing Status:   Standing     Number of Occurrences:   1     Order Specific Question:   Description of Order:     Answer:   Peripheral IV access:    DO NOT RESUSCITATE     Standing Status:   Standing     Number of Occurrences:   1    OXYGEN CANNULA Liters per minute: 2; Indications for O2 therapy: RESPIRATORY DISTRESS PRN Routine     Standing Status:   Standing     Number of Occurrences:   1     Order Specific Question:   Liters per minute: Answer:   2     Order Specific Question:   Indications for O2 therapy     Answer:   RESPIRATORY DISTRESS    sodium chloride (NS) flush 3 mL    sodium chloride (NS) flush 3 mL    DISCONTD: haloperidol lactate (HALDOL) injection 2 mg    DISCONTD: haloperidol lactate (HALDOL) injection 2 mg    acetaminophen (TYLENOL) suppository 650 mg    bisacodyL (DULCOLAX) suppository 10 mg    DISCONTD: haloperidol lactate (HALDOL) injection 2 mg    DISCONTD: haloperidol lactate (HALDOL) injection 2 mg    glycopyrrolate (ROBINUL) injection 0.2 mg    OR Linked Order Group     morphine injection 2 mg     morphine injection 2 mg    ziprasidone (GEODON) 5 mg in sterile water (preservative free) 0.25 mL injection    LORazepam (ATIVAN) injection 0.5 mg    haloperidol lactate (HALDOL) injection 2 mg    OR Linked Order Group     haloperidol lactate (HALDOL) injection 4 mg     haloperidol lactate (HALDOL) injection 4 mg    DISCONTD: haloperidoL (HALDOL) tablet 5 mg    DISCONTD: haloperidol (HALDOL) 2 mg/mL oral solution 5 mg    OR Linked Order Group     haloperidol lactate (HALDOL) injection 4 mg     haloperidol (HALDOL) 2 mg/mL oral solution 5 mg    INITIAL PHYSICIAN ORDER: HOSPICE Level Of Care: General Inpatient; Reason for Admission: 12/7: (Jocy) Admitted GIP with metabolic encephalopathy for management of pain, dyspnea, agitation and wound care.      Standing Status:   Standing     Number of Occurrences:   1     Order Specific Question:   Status     Answer:   Hospice     Order Specific Question:   Level Of Care     Answer:   General Inpatient     Order Specific Question:   Reason for Admission     Answer:   12/7: (Jocy) Admitted GIP with metabolic encephalopathy for management of pain, dyspnea, agitation and wound care. Order Specific Question:   Inpatient Hospitalization Certified Necessary for the Following Reasons     Answer:   3. Patient receiving treatment that can only be provided in an inpatient setting (further clarification in H&P documentation)     Order Specific Question:   Admitting Diagnosis     Answer:   Metabolic encephalopathy [987.99. ICD-9-CM]     Order Specific Question:   Terminal Prognosis Diagnosis(es)     Answer:   Metabolic encephalopathy [974.20. ICD-9-CM]     Order Specific Question:   Admitting Physician     Answer:   Shlomo Burt [[id number]]     Order Specific Question:   Attending Physician     Answer:   Lory Leary     Order Specific Question:   Discharge Plan:     Answer: Other (Specify)    IP CONSULT TO SOCIAL WORK     Once on week one, then PRN for Psychosocial crisis intervention or per patient or caregiver request.  For [facility] patients only. For contracted patients, primary hospice will continue to manage social work needs. Standing Status:   Standing     Number of Occurrences:   1     Order Specific Question:   Reason for Consult: Answer: Once on week one, then PRN for Psychosocial crisis intervention or per patient or caregiver request.       Allergies:  No Known Allergies    Care Plan:  Encounter Problems (Active)     Problem: Alteration in Mobility     Dates: Start: 12/07/21       Disciplines: Interdisciplinary    Goal: Remain as independent as possible and remain safe in environment     Dates: Start: 12/07/21   Expected End: 12/14/21       Description: Patient will remain as independent as possible and remain safe in their environment.     Disciplines: Interdisciplinary    Intervention: Assess for deficits in mobility     Dates: Start: 12/07/21          Intervention: Instruct on mobility methods     Dates: Start: 12/07/21       Description: Instruct patient/caregiver on mobility methods. Problem: Anticipatory Grief     Dates: Start: 12/07/21       Disciplines: Interdisciplinary    Goal: Explore reactions to and verbalize acceptance of impending loss     Dates: Start: 12/07/21       Description: Patient/family/caregiver will explore reactions to and verbalize acceptance of impending loss. Disciplines: Interdisciplinary    Intervention: Assess grief responses     Dates: Start: 12/07/21          Intervention: Assist with grief counseling     Dates: Start: 12/07/21       Description:  to assist.       Intervention: Hebert Pulling on stages of grief     Dates: Start: 12/07/21       Description: Instruct patient/caregiver on stages of grief. Intervention: Offer grief support group     Dates: Start: 12/07/21       Description: Patient/family/caregiver will explore reactions to and verbalize acceptance of impending loss. Problem: Anticipatory Grief     Dates: Start: 12/09/21       Description: GOAL: Mendel and her childrenMelissa will demonstrate appropriate anticipatory grief reactions related to Mendal's impending death as evidenced by their ability to verbalize feelings associated with grief such as denial, bargaining, anger, and depression. They will display feelings and associated behaviors in a healthy manner during inpatient hospice stay.          Disciplines: Interdisciplinary    Goal: Grief heard and acknowledged, anxiety reduced, patient coping identified, patient/family expressed gratitude     Dates: Start: 12/09/21   Expected End: 12/17/21       Description: GOAL:  Mendel and her Melissa chatman will demonstrate appropriate anticipatory grief reactions related to Mendal's impending death as evidenced by   their ability to verbalize feelings associated with grief such as denial, bargaining, anger, and depression. They will display feelings and associated behaviors in a healthy manner during inpatient hospice stay. Disciplines: Interdisciplinary    Intervention: Assess grief responses     Dates: Start: 12/09/21       Description: Krista Yoo will assess grief reactions with each visit. Intervention: Support grieving process     Dates: Start: 12/09/21       Description: Krista Yoo will provide opportunities for meaningful, nonjudgmental conversations and education. Problem: Anxiety/Agitation     Dates: Start: 12/07/21       Disciplines: Interdisciplinary    Goal: Verbalize or staff assess the ability to manage anxiety     Dates: Start: 12/07/21   Expected End: 12/10/21       Description: The patient/family/caregiver will verbalize and demonstrate ability to manage the patient's anxiety throughout hospice care. Disciplines: Interdisciplinary    Intervention: Assess for anxiety/agitation     Dates: Start: 12/07/21       Description: Assess for signs and symptoms of anxiety and agitation. Intervention: Instruct/Implement strategies to reduce anxiety/agitation     Dates: Start: 12/07/21       Description: Instruct patient/caregiver on strategies to reduce anxiety/agitation.              Problem: Breathing Pattern - Ineffective     Disciplines: Interdisciplinary    Goal: *Use of effective breathing techniques     Dates: Start: 12/07/21   Expected End: 12/10/21       Disciplines: Interdisciplinary    Intervention: Patent airway maintenance     Dates: Start: 12/07/21          Intervention: Head of bed elevation     Dates: Start: 12/07/21          Intervention: Breathing pattern signs and symptoms assessment (eg: Apnea; bradypnea; pursed-lip; dyspnea; hyperpnea; paradoxical; periodic; hyperventilation; hypoventilation; tachypnea)     Dates: Start: 12/07/21          Intervention: Anxiety assessment (eg: Attention span; behavior manifestation; coping mechanism;  self-perception; sleep pattern)     Dates: Start: 12/07/21          Intervention: Monitor for change in patient condition (eg: Vital signs; hypoxemia; mental status; level of consciousness; skin temperature, color)     Dates: Start: 12/07/21          Intervention: Respiratory management (eg: Oxygen therapy; suctioning)     Dates: Start: 12/07/21                Problem: Communication Deficit     Dates: Start: 12/07/21       Disciplines: Interdisciplinary    Goal: Effectively communicate symptoms, needs, and concerns     Dates: Start: 12/07/21   Expected End: 12/14/21       Description: Patient/family/caregiver will effectively communicate symptoms, needs and concerns. Disciplines: Interdisciplinary    Intervention: Assess for deficit in communication     Dates: Start: 12/07/21          Intervention: Instruct/Implement strategies to effectively communicate     Dates: Start: 12/07/21       Description: Instruct patient/caregiver on strategies to effectively communicate. Problem: Coping and Emotional Distress     Dates: Start: 12/07/21       Disciplines: Interdisciplinary    Goal: Demonstrate acceptance of terminal illness and understanding of disease progression     Dates: Start: 12/07/21       Description: Patient/family/caregiver will demonstrate acceptance of terminal disease and understanding of disease progression while employing appropriate coping mechanisms. Disciplines: Interdisciplinary    Intervention: Assess for alteration in coping     Dates: Start: 12/07/21          Intervention: Assess for signs/symptoms of emotional distress     Dates: Start: 12/07/21          Intervention: Instruct on effective coping skills     Dates: Start: 12/07/21       Description: Instruct patient/caregiver on effective coping skills.        Intervention: Instruct on strategies to reduce emotional distress     Dates: Start: 12/07/21       Description: Instruct patient/caregiver on strategies to reduce emotional distress. Problem: Discharge Planning     Dates: Start: 12/07/21       Disciplines: Interdisciplinary    Goal: *Participates in discharge planning     Dates: Start: 12/07/21   Expected End: 12/14/21       Disciplines: Interdisciplinary    Intervention: Healthcare knowledge assessment to include dying process, prognosis, treatment regimen, medications upon discharge     Dates: Start: 12/07/21          Intervention: Advanced care planning     Dates: Start: 12/07/21          Intervention: Identify support systems     Dates: Start: 12/07/21                Problem: Dyspnea Due to End of Life     Dates: Start: 12/07/21       Disciplines: Interdisciplinary    Goal: Demonstrate understanding of and ability to manage respiratory symptoms at end of life     Dates: Start: 12/07/21   Expected End: 12/10/21       Disciplines: Interdisciplinary    Intervention: Assess for signs and symptoms of dyspnea     Dates: Start: 12/07/21          Intervention: Severiano Maizes on causes/symptoms of dyspnea     Dates: Start: 12/07/21          Intervention: Severiano Maizes patient/caregiver/family on strategies to effectively manage dyspnea     Dates: Start: 12/07/21                Problem: Emotional Support Needs     Dates: Start: 12/09/21       Description: Pt, pt's daughter Arsenio Luis, and pt's family will have their emotional support needs met weekly by SW. Disciplines: Interdisciplinary    Goal: Patient/family is receiving emotional support     Dates: Start: 12/09/21   Expected End: 12/17/21       Description: Pt, Caitlyn, and family will receive emotional support from SW weekly through weekly check-ins, education on the hospice philosophy, validation of feelings, rapport building, the use of a  if needed, and active listening throughout pt's hospice journey.      Disciplines: Interdisciplinary    Intervention: Assess for emotional distress     Dates: Start: 12/09/21       Description: SW will assess for emotional distress in pt using visual charts, or a , and observation and assessment of nonverbal cues, and distress in Caitlyn and pt's family through the use of open ended questions, observation of verbal/nonverbal cues, and motivational interviewing techniques. Intervention: Provide emotional support of the family's cultural expressions of grief and loss     Dates: Start: 12/09/21       Description: SW will provide emotional support of the family's cultural expression of grief, loss, and response to illness. Problem: End of Life Process     Dates: Start: 12/07/21       Disciplines: Interdisciplinary    Goal: Demonstrate understanding of end of life processes     Dates: Start: 12/07/21   Expected End: 12/14/21       Description: Rudy Jarrett will understand end of life processes. Disciplines: Interdisciplinary    Intervention: Assess for signs/symptoms of terminal restlessness     Dates: Start: 12/07/21          Intervention: Implement strategies to reduce terminal restlessness     Dates: Start: 12/07/21          Intervention: Instruct on the dying process     Dates: Start: 12/07/21          Intervention: Instruct: imminent death     Dates: Start: 12/07/21          Intervention: Instruct: process at end of life     Dates: Start: 12/07/21                Problem: Falls - Risk of     Dates: Start: 12/08/21       Disciplines: Interdisciplinary    Goal: *Absence of Falls     Dates: Start: 12/08/21       Description: Document Raeluli Maddison Fall Risk and appropriate interventions in the flowsheet.     Disciplines: Interdisciplinary          Problem: Grieving     Disciplines: Interdisciplinary    Goal: *Able to express feelings of grief     Dates: Start: 12/07/21       Disciplines: Interdisciplinary    Intervention: Complicated grief signs assessment     Dates: Start: 12/07/21          Intervention: Complicated grief risk assessment Dates: Start: 21          Intervention: Assess family practices, cultural, spiritual, and Buddhism healthcare practices and language     Dates: Start: 21             Goal: *Able to identify stages of grieving process     Dates: Start: 21       Disciplines: Interdisciplinary    Intervention: Death ritual assessment     Dates: Start: 21          Intervention: Grieving process stage assessment     Dates: Start: 21          Intervention: Bereavement plan initiation     Dates: Start: 21          Intervention: Provide bereavement information, maintain a proactive family-focused approach, use structured format and allow for time for the family to talk     Dates: Start: 21          Intervention: Provide information on availability of spiritual care services, and culturally sensitive mourning rituals     Dates: Start: 21          Intervention: Assist patient and family to decide about autopsy,  plans, completing important life tasks, coping with impending death, engaging in meaningful rituals, providing care of the body after death     Dates: Start: 21                Problem: Hospice Orientation     Dates: Start: 21       Disciplines: Interdisciplinary    Goal: Demonstrate understanding of hospice philosophy, plan of care, and home hospice program     Dates: Start: 21   Expected End: 21       Description: The patient/family/caregiver will demonstrate understanding of hospice philosophy, plan of care and the home hospice program as evidenced by participation in meeting the patient's psychosocial, spiritual, medical, and physical needs inclusive of medical supplies/equipment focusing on symptoms.     Disciplines: Interdisciplinary    Intervention: Instruct on hospice philosophy     Dates: Start: 21          Intervention: Instruct: hospice orientation     Dates: Start: 21          Intervention: Instruct: medical equipment     Dates: Start: 21       Description: Instruct patient/caregiver on medical equipment and supplies. Intervention: Instruct: medical power of  and will     Dates: Start: 21          Intervention: Instruct:terminal illness     Dates: Start: 21                Problem: Imminent Death     Dates: Start: 21       Disciplines: Interdisciplinary    Goal: Collaborate with patient/family/caregiver/interdisciplinary team to minimize and manage end of life symptoms     Dates: Start: 21       Disciplines: Interdisciplinary    Intervention: Instruct on end of life issues     Dates: Start: 21       Description: Instruct patient/caregiver on end of life issues. Intervention: Instruct on impending death     Dates: Start: 21       Description: Instruct on impending death, need for  arrangements, physical care, patient wishes, hospice role, and signs/symptoms of approaching death. Intervention: Instruct on what to do at death     Dates: Start: 21       Description: Instruct caregiver on what to do at time of death. Intervention: Plan for death     Dates: Start: 21       Description: Assess patient/caregiver emotional readiness and plan for death and assist as needed.              Problem: Infection - Risk of, Central Venous Catheter-Associated Bloodstream Infection     Dates: Start: 21       Disciplines: Interdisciplinary    Goal: *Absence of infection signs and symptoms     Dates: Start: 21   Expected End: 21       Disciplines: Interdisciplinary    Intervention: Central venous catheter site(s) assessment (eg: Pain; color; exudate; edema; odor; skin integrity)     Dates: Start: 21          Intervention: Infection assessment -general (eg: Onset of weakness; white blood cells; shivering; hyper/hypoglycemia; temp/weight/energy/mental status altered; tachycardia; hypotension; tachypnea; respiratory)     Dates: Start: 21 Intervention: Central venous catheter management per bundle (eg: Lines maintained; port sterility maintained; dressing changed per policy and procedure)     Dates: Start: 12/07/21                Problem: Infection - Risk of, Urinary Catheter-Associated Urinary Tract Infection     Dates: Start: 12/07/21       Disciplines: Interdisciplinary    Goal: *Absence of infection signs and symptoms     Dates: Start: 12/07/21   Expected End: 12/14/21       Disciplines: Interdisciplinary    Intervention: Urinary catheter needs assessment     Dates: Start: 12/07/21       Description: Medically/surgically unstable; Chemically paralyzed; Obstruction/retention; Strict I/Os;  Surgical procedure; Comfort Care; Multiple Stage 3 or 4 pressure ulcers, chest to knees       Intervention: Urine assessment (eg: Clarity; color; odor; volume)     Dates: Start: 12/07/21          Intervention: Infection signs and symptoms monitoring - urinary tract (eg: Flank or suprapubic pain; burning; fever; dysuria; frequency; urgency; cloudy/bloody/foul odor urine; confusion/agitation; incontinence)     Dates: Start: 12/07/21          Intervention: Lab monitoring (eg: Urinalysis; culture and sensitivity; complete blood count with differential)     Dates: Start: 12/07/21          Intervention: Urinary catheter management (eg: Closed urinary catheter system maintenance; urinary catheter patency with free downhill flow; perineal care; monitor intake and output)     Dates: Start: 12/07/21          Intervention: Urinary catheter discontinuation     Dates: Start: 12/07/21                Problem: Nausea/Vomiting (Adult)     Dates: Start: 12/07/21       Disciplines: Interdisciplinary    Goal: *Absence of nausea/vomiting     Dates: Start: 12/07/21   Expected End: 12/10/21       Disciplines: Interdisciplinary    Intervention: Aspiration risk - signs and symptoms (eg: Ineffective cough; altered level of consciousness; impaired mobility; drooling; poor dentition; vomiting; slurred speech; prolonged supine)     Dates: Start: 12/07/21          Intervention: Nonpharmacologic nausea management (eg:  Consistent room temperature; deep breathing; distraction; ice chips; minimal moving; pain management)     Dates: Start: 12/07/21          Intervention: Administer antiemetics as needed     Dates: Start: 12/07/21                Problem: Pain     Dates: Start: 12/07/21       Disciplines: Interdisciplinary    Goal: Assess satisfaction of level of comfort and symptom control     Dates: Start: 12/07/21   Expected End: 12/10/21       Disciplines: Interdisciplinary    Intervention: Assess effectiveness of pain management     Dates: Start: 12/07/21          Intervention: Assess for signs/symptoms of acute pain     Dates: Start: 12/07/21          Intervention: Assess for signs/symptoms of chronic pain     Dates: Start: 12/07/21          Intervention: Implement non-pharmacological pain management     Dates: Start: 12/07/21          Intervention: Instruct on pain scales     Dates: Start: 12/07/21          Intervention: Implement pharmacological pain management     Dates: Start: 12/07/21             Goal: *Control of acute pain     Dates: Start: 12/07/21   Expected End: 12/10/21       Disciplines: Interdisciplinary    Intervention: Assess pain characteristics (eg: Intensity scale; onset; location; quality; severity; duration; frequency; radiation)     Dates: Start: 12/07/21          Intervention: Assess pain management - barriers (eg: Past pain experiences)     Dates: Start: 12/07/21          Intervention: Identify pain expectations (eg: Patient's pain goal; somatic experiences; behavioral changes; affect)     Dates: Start: 12/07/21          Intervention: Identify pain medication concerns (eg: Cultural considerations; addiction concerns)     Dates: Start: 12/07/21          Intervention: Support system identification (eg: Caregiver; community resource; family; friends; Denominational; support group) Dates: Start: 12/07/21          Intervention: Monitor for change in patient condition (eg:  Vital signs changes; changes in level of consciousness; nausea; behavioral changes)     Dates: Start: 12/07/21          Intervention: Medication side-effect assessment     Dates: Start: 12/07/21          Intervention: Pain-relief response reassessment (eg: Frequency based on route of administration; effectiveness)     Dates: Start: 12/07/21                Problem: Patient Education: Go to Patient Education Activity     Dates: Start: 12/07/21       Disciplines: Interdisciplinary    Goal: Patient/Family Education     Dates: Start: 12/07/21       Disciplines: Interdisciplinary          Problem: Patient Education: Go to Patient Education Activity     Dates: Start: 12/08/21       Disciplines: Interdisciplinary    Goal: Patient/Family Education     Dates: Start: 12/08/21       Disciplines: Interdisciplinary          Problem: Potential for Alteration in Skin Integrity     Dates: Start: 12/07/21       Disciplines: Interdisciplinary    Goal: Monitor skin for areas of alteration in skin integrity     Dates: Start: 12/07/21   Expected End: 12/14/21       Description: Patient/family/caregiver will demonstrate ability to care for patient's skin, monitor for areas of breakdown, and demonstrate methods to prevent breakdown during hospice care. Disciplines: Interdisciplinary    Intervention: Assess for skin breakdown     Dates: Start: 12/07/21          Intervention: Implement strategies to reduce risk of skin breakdown     Dates: Start: 12/07/21                Problem: Pressure Injury - Risk of     Dates: Start: 12/07/21       Disciplines: Interdisciplinary    Goal: *Prevention of pressure injury     Dates: Start: 12/07/21   Expected End: 12/14/21       Description: Document Brendon Scale and appropriate interventions in the flowsheet.     Disciplines: Interdisciplinary          Problem: Risk for Falls     Dates: Start: 12/07/21 Disciplines: Interdisciplinary    Goal: Free of falls during inpatient stay     Dates: Start: 12/07/21   Expected End: 12/14/21       Description: Patient will be free of falls during inpatient stay. Disciplines: Interdisciplinary    Intervention: Assess fall risk     Dates: Start: 12/07/21       Description: Complete fall risk assessment on admission, recertification, and as indicated on fall. Intervention: Instruct on fall prevention     Dates: Start: 12/07/21       Description: Call for assistance with ambulation and transfers during inpatient stay             Problem: Spiritual Distress     Dates: Start: 12/07/21       Disciplines: Interdisciplinary    Goal: Distress heard, acknowledged, and addressed     Dates: Start: 12/07/21       Description: Patient/family/caregiver distress will be heard, acknowledged, and addressed throughout hospice care. Disciplines: Interdisciplinary    Intervention: Assess for signs/symptoms of spiritual distress     Dates: Start: 12/07/21          Intervention: Consult on spiritual care     Dates: Start: 12/07/21       Description: Consult to Spiritual Care       Intervention: Discuss strategies to reduce spiritual distress     Dates: Start: 12/07/21       Description: Discuss with patient/caregiver strategies to reduce spiritual distress.             Care Plan Problems/Goals  Report    0 of 27 Goals Met 0 of 27 Met     Progressing Towards Goal (24)      *Prevention of pressure injury (Pressure Injury - Risk of)    Disciplines:  Interdisciplinary Expected end:  12/14/21        Outcome: Progressing Towards Goal By Jenifer Salmeron on 12/09/21 1334            Patient/Family Education (Patient Education: Go to Patient Education Activity)    Disciplines:  Interdisciplinary Expected end:  -        Outcome: Progressing Towards Goal By Jenifer Salmeron on 12/09/21 1334            Demonstrate understanding of hospice philosophy, plan of care, and home hospice program [facility] Orientation) Disciplines:  Interdisciplinary Expected end:  12/14/21        Outcome: Progressing Towards Goal By Tomma Betters on 12/09/21 1334            Monitor skin for areas of alteration in skin integrity (Potential for Alteration in Skin Integrity)    Disciplines:  Interdisciplinary Expected end:  12/14/21        Outcome: Progressing Towards Goal By Tomma Betters on 12/09/21 1334            Free of falls during inpatient stay (Risk for Falls)    Disciplines:  Interdisciplinary Expected end:  12/14/21        Outcome: Progressing Towards Goal By Tomma Betters on 12/09/21 1334            Remain as independent as possible and remain safe in environment (Alteration in Mobility)    Disciplines:  Interdisciplinary Expected end:  12/14/21        Outcome: Progressing Towards Goal By Tomma Betters on 12/09/21 1334            Assess satisfaction of level of comfort and symptom control (Pain)    Disciplines:  Interdisciplinary Expected end:  12/10/21         Outcome: Progressing Towards Goal By Tomma Betters on 12/09/21 1334            *Control of acute pain (Pain)    Disciplines:  Interdisciplinary Expected end:  12/10/21         Outcome: Progressing Towards Goal By Taye Abdul RN on 12/09/21 0618            Explore reactions to and verbalize acceptance of impending loss (Anticipatory Grief)    Disciplines:  Interdisciplinary Expected end:  -        Outcome: Progressing Towards Goal By Tomma Betters on 12/09/21 1334            Verbalize or staff assess the ability to manage anxiety (Anxiety/Agitation)    Disciplines:  Interdisciplinary Expected end:  12/10/21         Outcome: Progressing Towards Goal By Tomma Betters on 12/09/21 1334            Effectively communicate symptoms, needs, and concerns (Communication Deficit)    Disciplines:  Interdisciplinary Expected end:  12/14/21        Outcome: Progressing Towards Goal By Tomma Betters on 12/09/21 1334            Demonstrate acceptance of terminal illness and understanding of disease progression (Coping and Emotional Distress)    Disciplines:  Interdisciplinary Expected end:  -        Outcome: Progressing Towards Goal By Ana Joyce on 12/09/21 1334            *Use of effective breathing techniques (Breathing Pattern - Ineffective)    Disciplines:  Interdisciplinary Expected end:  12/10/21         Outcome: Progressing Towards Goal By Nelida Meier RN on 12/09/21 0618            *Able to express feelings of grief (Grieving)    Disciplines:  Interdisciplinary Expected end:  -        Outcome: Progressing Towards Goal By Nelida Meier RN on 12/09/21 0618            *Absence of nausea/vomiting (Nausea/Vomiting (Adult))    Disciplines:  Interdisciplinary Expected end:  12/10/21         Outcome: Progressing Towards Goal By Nelida Meier RN on 12/09/21 0618            *Absence of infection signs and symptoms (Infection - Risk of, Central Venous Catheter-Associated Bloodstream Infection)    Disciplines:  Interdisciplinary Expected end:  12/14/21        Outcome: Progressing Towards Goal By Nelida Meier RN on 12/09/21 0618            *Absence of infection signs and symptoms (Infection - Risk of, Urinary Catheter-Associated Urinary Tract Infection)    Disciplines:  Interdisciplinary Expected end:  12/14/21        Outcome: Progressing Towards Goal By Nelida Meier RN on 12/09/21 8515            Demonstrate understanding of end of life processes (End of Life Process)    Disciplines:  Interdisciplinary Expected end:  12/14/21        Outcome: Progressing Towards Goal By Nelida Meier RN on 12/09/21 4535            Demonstrate understanding of and ability to manage respiratory symptoms at end of life (Dyspnea Due to End of Life)    Disciplines:  Interdisciplinary Expected end:  12/10/21         Outcome: Progressing Towards Goal By Nelida Meier RN on 12/09/21 2064            Collaborate with patient/family/caregiver/interdisciplinary team to minimize and manage end of life symptoms (Imminent Death) Disciplines:  Interdisciplinary Expected end:  -        Outcome: Progressing Towards Goal By Gold Bridges RN on 12/09/21 0028            *Participates in discharge planning (Discharge Planning)    Disciplines:  Interdisciplinary Expected end:  12/14/21        Outcome: Progressing Towards Goal By Skye Bone RN on 12/07/21 2342            *Absence of Falls (Falls - Risk of)    Disciplines:  Interdisciplinary Expected end:  -        Outcome: Progressing Towards Goal By Gold Bridges RN on 12/09/21 9901            Patient/family is receiving emotional support (Emotional Support Needs)    Disciplines:  Interdisciplinary Expected end:  12/17/21        Outcome: Progressing Towards Goal By Scar Lyn LMSW on 12/09/21 1244            Grief heard and acknowledged, anxiety reduced, patient coping identified, patient/family expressed gratitude (Anticipatory Grief)    Disciplines:  Interdisciplinary Expected end:  12/17/21        Outcome: Progressing Towards Goal By Last Robles on 12/09/21 1621                         No Outcome (3)      Distress heard, acknowledged, and addressed (Spiritual Distress)    Disciplines:  Interdisciplinary Expected end:  -          *Able to identify stages of grieving process (Grieving)    Disciplines:  Interdisciplinary Expected end:  -          Patient/Family Education (Patient Education: Go to Patient Education Activity)    Disciplines:  Interdisciplinary Expected end:  -                            ___________________    Care Team Notes          POC/IDG Notes      [facility] IDG  Notes by Last Robles at 12/10/21 1117  Version 1 of 1    Author: Last Robles Service: Spiritual Care Author Type: Pastoral Care    Filed: 12/10/21 1130 Date of Service: 12/10/21 1117 Status: Signed    : Last Robles (Pastoral Care)       Patient: Jarad Villalobos    Date: 12/10/21  Time: 11:17 AM    [facility]  Notes  Intervention: Ministry of presence, initial assessments completed, family care with active listening and education, compassion and prayer. Coordination with medical director for urgent intervention due to not being able to calm patient. Prayer, provided \" Gone From My Sight\". Provided bereavement information. Identified [employer]. Outcome:   Conversation with family. Medical Director came to room assessed and adjusted medication. Family expressed appreciation for support. Son inquired about bereavement support. Plan: Continue to provide spiritual and emotional support. GOAL: Mendel and her children, Jan Santillan will demonstrate appropriate anticipatory grief reactions related to Espinoza's impending death as evidenced by their ability to verbalize feelings associated with grief such as denial, bargaining, anger, and depression. They will display feelings and associated behaviors in a healthy manner during inpatient hospice stay. Interventions:  will assess grief reactions with each visit.  will provide opportunities for meaningful, nonjudgmental conversations and education. Signed by: Mercedes Winston       [facility] IDG  Notes by Glenis Granados LMSW at 12/10/21 1114  Version 1 of 1    Author: Glenis Granados LMSW Service: -- Author Type: Licensed Masters in Social Work    Filed: 12/10/21 1114 Date of Service: 12/10/21 1114 Status: Signed    : Glenis Granados [facility] (Licensed Masters in Social Work)       Patient: Maximus Hair    Date: 12/10/21  Time: 11:14 AM    [facility]  Notes  Pt remains GIP LOC. No changes in the plan of care. SW will continue to provide ongoing emotional support and assessment of psychosocial and bereavement concerns, as well as needs until discharge.          Signed by: Naeem Juárez LMSW       [facility] IDG  Notes by Demarco Keller at 12/09/21 1418  Version 1 of 1    Author: Demarco Keller Service: Spiritual Care Author Type: Pastoral Care Filed: 12/09/21 1419 Date of Service: 12/09/21 1418 Status: Signed    : Veronica Bennett (Pastoral Care)       Patient: Selam Zapata    Date: 12/09/21  Time: 2:18 PM    [facility]  Notes  / Grief Counselor has reviewed  Initial Comprehensive Assessment and plan of care. Bereavement and Spiritual Care Assessments to be completed and plan of care put in place to meet patient and family needs. Signed by: Rishabh Clark       [facility] IDG  Notes by Chuy Brito LMSW at 12/08/21 1008  Version 1 of 1    Author: Chuy Brito LMSW Service: -- Author Type: Licensed Masters in Social Work    Filed: 12/08/21 1009 Date of Service: 12/08/21 1008 Status: Signed    : Mar[facility] (Licensed Masters in Social Work)       Patient: Selam Zapata    Date: 12/08/21  Time: 10:08 AM    [facility]  Notes  SW has read the initial comprehensive assessment and plan of care. No immediate needs noted. Pt needs an  for sign language and SW will ensure an  is available before completing assessment with pt, if applicable. Initial SW assessment visit will be completed within 5 days of admission. Signed by: Shawn Cho LMSW       [facility] IDG Nurse Notes by Cleo Cruz RN at 12/07/21 2305  Version 1 of 1    Author: Cleo Cruz RN Service: Hospice and Palliative Care Author Type: Registered Nurse    Filed: 12/07/21 2306 Date of Service: 12/07/21 2305 Status: Signed    : Cleo Cruz RN (Registered Nurse)       Patient: Selam Zapata    Date: 12/07/21  Time: 11:05 PM    [facility] Nurse Notes  Ivan Archer, 62 y.o. F. Arrived from [place] to room 101 at the [facility]. Pt medicated prior to transfer with Morphine 2 mg IVP. Pt admitted with DX: Sepsis and aspiration pneumonia. LOC: GIP. Pt is a DNR. Pt transferred to bed. Pt repositioned. Pt with eyes opened. Non verbal. No facial grimace. Flacc =0-1.  Resp irreg non labored on 2 L n/c. Lungs with bilateral rales. HR irreg. BS active. No edema noted at this time. Melo cath draining orange clear urine. BLE with contractures. Allevyn dressings no rachel  Areas and back for protection. Peripheral line flushed with 3 ml ns. Flushed well. Dressing clean/dry/intact. Admission complete and initial General Hospice care initiated which includes spiritual,psychosocial, bereavement, Md, and IDG team. IDG team made aware of plan of care and immediate needs. Pt is under GIP level of care. Will continue to assess need for change of level of care. Collaborate with IDG team regarding discharge planning. Pt is expected to  under GIP level of care. Reviewed care plan with CNA. Signed by: Danae Alford RN                Care Team Present:   Team Members Present: Physician, Nurse, , , Vol Coordinator, Other (Comment)  Physician Team Member: Dr. Cecilia Wolfe  Nurse Team Member: Aydee Willis NP  Social Work Team Member: Alysha Kaba Team Member: Darwin Bruce  Vol Coordinator: María Bauman  Other Discipline Present (Name):  yAdee Willis NP

## 2021-12-10 NOTE — PROGRESS NOTES
Progress Note    Patient: Chacha Saul MRN: [id number]  SSN: [id number]    YOB: 1960  Age: 62 y.o. Sex: female      Admit Date: 12/7/2021    LOS: 3 days     Subjective:     Confused, yelling out. Agitated. Moved closer to nurses station last night for closer observation due to trying to get out of bed. NPO today. PRN medications/symptoms:  Haldol 2mg x 3 and 4mg IV x 1 for agitation  Geodon 5mg IM x 1 for agitation  Morphine 2mg IV x 5 for pain    Review of Systems:  Review of systems not obtained due to patient factors. Objective:     Vitals:    12/10/21 0700 12/10/21 0830 12/10/21 1130 12/10/21 1404   BP:       Pulse:       Resp: 12 12 14 16   Temp:       Weight:       Height:            Intake and Output:  Current Shift: No intake/output data recorded. Last three shifts: 12/08 1901 - 12/10 0700  In: -   Out: 700 [Urine:700]    Physical Exam:   GENERAL: mild distress, appears older than stated age, pale, cachectic, lethargic, restless  LUNG: Coarse breath sounds, diminished in the right lower lobe. Unlabored respirations. HEART: regular rate and rhythm  ABDOMEN: soft, non-tender. Bowel sounds hypoactive. : Melo catheter with debbie urine. EXTREMITIES:  extremities with no cyanosis or edema. + pulses. SKIN: Pale. Warm to touch. Peripheral IV in left forearm with dressing intact. NEUROLOGIC: Lethargic, restless. Nonverbal. Generalized weakness. Bedbound. PSYCHIATRIC: agitated at times    Lab/Data Review:  No new labs resulted in the last 24 hours.     Assessment:     Principal Problem:    Metabolic encephalopathy (37/3/3283)    Active Problems:    Dementia in chronic schizophrenia ([address] (12/10/2021)      Dehydration, moderate (12/10/2021)      Senile debility (12/10/2021)      Severe protein-calorie malnutrition ([address] (12/10/2021)      Deaf, requires  (6/12/2014)      Aspiration pneumonia ([address] (12/3/2021)        Plan:     Current Facility-Administered Medications   Medication Dose Route Frequency    haloperidol lactate (HALDOL) injection 4 mg  4 mg IntraVENous Q8H    Or    haloperidol (HALDOL) 2 mg/mL oral solution 5 mg  5 mg SubLINGual Q8H    haloperidol lactate (HALDOL) injection 4 mg  4 mg IntraMUSCular Q2H PRN    Or    haloperidol lactate (HALDOL) injection 4 mg  4 mg IntraVENous Q2H PRN    ziprasidone (GEODON) 5 mg in sterile water (preservative free) 0.25 mL injection  5 mg IntraMUSCular Q6H PRN    LORazepam (ATIVAN) injection 0.5 mg  0.5 mg IntraVENous Q6H    sodium chloride (NS) flush 3 mL  3 mL IntraVENous Q12H    sodium chloride (NS) flush 3 mL  3 mL IntraVENous PRN    acetaminophen (TYLENOL) suppository 650 mg  650 mg Rectal Q3H PRN    bisacodyL (DULCOLAX) suppository 10 mg  10 mg Rectal PRN    glycopyrrolate (ROBINUL) injection 0.2 mg  0.2 mg IntraVENous Q4H PRN    morphine injection 2 mg  2 mg IntraVENous Q20MIN PRN    Or    morphine injection 2 mg  2 mg SubCUTAneous Q20MIN PRN       12/7: ([place]) Admitted GIP with metabolic encephalopathy for management of pain, dyspnea, agitation.     1. Pain: Morphine 2mg IV/SQ Q20 minutes as needed.     2. Dyspnea: Morphine 2mg IV/SQ Q20 minutes as needed. Glycopyrrolate 0.2mg q4 prn secretions. Oxygen at 2 L/min prn.     3. Agitation: Lorazepam 1mg IV/IM q6. Haloperidol 4mg IV/SQ Q2 hour prn. Geodon 5mg IM Q4 prn.      4. Family/Pt Support: Family at bedside during exam. Medications and plan of care discussed with nursing staff and family. Will continue to monitor for symptoms and adjust medications as needed to maintain patient comfort. PPS 10%. Case discussed with Dr. Aida Cano.      DC scheduled haldol and add haldol 4mg IV q8 or Haldol 5mg po Q8.      Signed By: Gilmar Regalado NP     December 10, 2021

## 2021-12-10 NOTE — PROGRESS NOTES
1914:  Patient transferred to room 107. Patient hollering out loudly; trying to get out of bed. Medicated with PRN Geodon IM as ordered for agitation. Primary nurse, Asmita, aware and to reassess.

## 2021-12-10 NOTE — ADVANCED PRACTICE NURSE
IDG NOTE    12/7: (Jocy) Admitted GIP with metabolic encephalopathy for management of pain, dyspnea, agitation. (Was a resident at [facility])    Subjective:   Confused, yelling out. Agitated. Moved closer to nurses station last night for closer observation due to trying to get out of bed. Intake:  Pleasure diet: 2 pudding cups, 2.5 applesauce cups, 1 yogurt cup yesterday. No breakfast this morning. Scheduled medications:  Current Facility-Administered Medications   Medication Dose Route Frequency    haloperidoL (HALDOL) tablet 5 mg  5 mg Oral TID    Or    haloperidol (HALDOL) 2 mg/mL oral solution 5 mg  5 mg Oral TID    LORazepam (ATIVAN) injection 0.5 mg  0.5 mg IntraVENous Q6H    sodium chloride (NS) flush 3 mL  3 mL IntraVENous Q12H       PRN medications/symptoms:  Haldol 2mg x 3 and 4mg IV x 1 for agitation  Geodon 5mg IM x 1 for agitation  Morphine 2mg IV x 5 for pain    Wounds:  N/A    Output: Melo catheter 600  ml urine output    IV access: Peripheral IV LFA    Oxygen:  Room air    Patient Vitals for the past 24 hrs:   Temp Pulse Resp BP   12/10/21 0830 -- -- 12 --   12/10/21 0700 -- -- 12 --   12/10/21 0545 (!) 96.4 °F (35.8 °C) (!) 51 (!) 7 (!) 86/56   12/09/21 1550 (!) 96.6 °F (35.9 °C) (!) 119 16 (!) 133/101       Changes in plan of care:    New patient: Comprehensive plan of care reviewed. IDG and pt./family in agreement with plan of care. The IDG identifies through on-going assessment when a change is needed to the POC; the pt/family will receive care and services necessitated by changes in POC. Medications reviewed by the pharmacist and Medical Director. Case discussed with Dr. Carolyn Washington and in [facility] meeting today. DC scheduled haldol and add haldol 4mg IV q8 or Haldol 5mg po Q8. Remains appropriate for [facility] level of care.      Ingrid Oppenheim APRN, NP-C  12/10/21

## 2021-12-10 NOTE — [facility] IDG SOCIAL WORKER NOTES
Patient: Florentino Duong    Date: 12/10/21  Time: 11:14 AM    [facility]  Notes  Pt remains GIP LOC. No changes in the plan of care. SW will continue to provide ongoing emotional support and assessment of psychosocial and bereavement concerns, as well as needs until discharge.          Signed by: Arash Glover LMSW

## 2021-12-10 NOTE — PROGRESS NOTES
Problem: Pressure Injury - Risk of  Goal: *Prevention of pressure injury  Description: Document Brendon Scale and appropriate interventions in the flowsheet. Outcome: Progressing Towards Goal  Note: Pressure Injury Interventions:  Sensory Interventions: Assess changes in LOC, Float heels, Check visual cues for pain, Keep linens dry and wrinkle-free, Minimize linen layers, Pressure redistribution bed/mattress (bed type), Turn and reposition approx. every two hours (pillows and wedges if needed)    Moisture Interventions: Apply protective barrier, creams and emollients, Internal/External urinary devices, Limit adult briefs, Check for incontinence Q2 hours and as needed, Minimize layers, Moisture barrier    Activity Interventions: Pressure redistribution bed/mattress(bed type)    Mobility Interventions: Float heels, Pressure redistribution bed/mattress (bed type), Turn and reposition approx. every two hours(pillow and wedges)    Nutrition Interventions: Document food/fluid/supplement intake    Friction and Shear Interventions: Apply protective barrier, creams and emollients, Foam dressings/transparent film/skin sealants, Lift sheet     Problem: Hospice Orientation  Goal: Demonstrate understanding of hospice philosophy, plan of care, and home hospice program  Description: The patient/family/caregiver will demonstrate understanding of hospice philosophy, plan of care and the home hospice program as evidenced by participation in meeting the patient's psychosocial, spiritual, medical, and physical needs inclusive of medical supplies/equipment focusing on symptoms. Outcome: Not Progressing Towards Goal AEB son with difficulty understanding hospice philosophy and plan of care.       Problem: Potential for Alteration in Skin Integrity  Goal: Monitor skin for areas of alteration in skin integrity  Description: Patient/family/caregiver will demonstrate ability to care for patient's skin, monitor for areas of breakdown, and demonstrate methods to prevent breakdown during hospice care. Outcome: Progressing Towards Goal    Problem: Pain  Goal: Assess satisfaction of level of comfort and symptom control  Outcome: Progressing Towards Goal     Problem: Anxiety/Agitation  Goal: Verbalize or staff assess the ability to manage anxiety  Description: The patient/family/caregiver will verbalize and demonstrate ability to manage the patient's anxiety throughout hospice care. Outcome: Progressing Towards Goal     Problem: Infection - Risk of, Urinary Catheter-Associated Urinary Tract Infection  Goal: *Absence of infection signs and symptoms  Outcome: Progressing Towards Goal     Problem: Falls - Risk of  Goal: *Absence of Falls  Description: Document Sherrill Fall Risk and appropriate interventions in the flowsheet. Outcome: Progressing Towards Goal  Note: Fall Risk Interventions:  Mobility Interventions: Bed/chair exit alarm    Mentation Interventions: Bed/chair exit alarm, Adequate sleep, hydration, pain control, Door open when patient unattended, More frequent rounding, Room close to nurse's station    Medication Interventions: Bed/chair exit alarm    Elimination Interventions: Bed/chair exit alarm, Toileting schedule/hourly rounds      Problem: Emotional Support Needs  Goal: Patient/family is receiving emotional support  Description: Pt, Caitlyn, and family will receive emotional support from SW weekly through weekly check-ins, education on the hospice philosophy, validation of feelings, rapport building, the use of a  if needed, and active listening throughout pt's hospice journey.    Outcome: Progressing Towards Goal

## 2021-12-11 NOTE — PROGRESS NOTES
Problem: Pressure Injury - Risk of  Goal: *Prevention of pressure injury  Description: Document Brendon Scale and appropriate interventions in the flowsheet. Outcome: Progressing Towards Goal  Note: Pressure Injury Interventions:  Sensory Interventions: Assess changes in LOC, Float heels, Check visual cues for pain, Keep linens dry and wrinkle-free, Minimize linen layers, Pressure redistribution bed/mattress (bed type), Turn and reposition approx. every two hours (pillows and wedges if needed)    Moisture Interventions: Apply protective barrier, creams and emollients, Internal/External urinary devices, Limit adult briefs, Check for incontinence Q2 hours and as needed, Minimize layers, Moisture barrier    Activity Interventions: Pressure redistribution bed/mattress(bed type)    Mobility Interventions: Float heels, Pressure redistribution bed/mattress (bed type), Turn and reposition approx. every two hours(pillow and wedges)    Nutrition Interventions: Document food/fluid/supplement intake    Friction and Shear Interventions: Apply protective barrier, creams and emollients, Foam dressings/transparent film/skin sealants, Lift sheet                Problem: Patient Education: Go to Patient Education Activity  Goal: Patient/Family Education  Outcome: Progressing Towards Goal     Problem: Hospice Orientation  Goal: Demonstrate understanding of hospice philosophy, plan of care, and home hospice program  Description: The patient/family/caregiver will demonstrate understanding of hospice philosophy, plan of care and the home hospice program as evidenced by participation in meeting the patient's psychosocial, spiritual, medical, and physical needs inclusive of medical supplies/equipment focusing on symptoms.   Outcome: Progressing Towards Goal     Problem: Potential for Alteration in Skin Integrity  Goal: Monitor skin for areas of alteration in skin integrity  Description: Patient/family/caregiver will demonstrate ability to care for patient's skin, monitor for areas of breakdown, and demonstrate methods to prevent breakdown during hospice care. Outcome: Progressing Towards Goal     Problem: Risk for Falls  Goal: Free of falls during inpatient stay  Description: Patient will be free of falls during inpatient stay. Outcome: Progressing Towards Goal     Problem: Alteration in Mobility  Goal: Remain as independent as possible and remain safe in environment  Description: Patient will remain as independent as possible and remain safe in their environment. Outcome: Progressing Towards Goal     Problem: Pain  Goal: Assess satisfaction of level of comfort and symptom control  Outcome: Progressing Towards Goal  Goal: *Control of acute pain  Outcome: Progressing Towards Goal     Problem: Anticipatory Grief  Goal: Explore reactions to and verbalize acceptance of impending loss  Description: Patient/family/caregiver will explore reactions to and verbalize acceptance of impending loss. Outcome: Progressing Towards Goal     Problem: Anxiety/Agitation  Goal: Verbalize or staff assess the ability to manage anxiety  Description: The patient/family/caregiver will verbalize and demonstrate ability to manage the patient's anxiety throughout hospice care. Outcome: Progressing Towards Goal     Problem: Communication Deficit  Goal: Effectively communicate symptoms, needs, and concerns  Description: Patient/family/caregiver will effectively communicate symptoms, needs and concerns. Outcome: Progressing Towards Goal     Problem: Coping and Emotional Distress  Goal: Demonstrate acceptance of terminal illness and understanding of disease progression  Description: Patient/family/caregiver will demonstrate acceptance of terminal disease and understanding of disease progression while employing appropriate coping mechanisms.   Outcome: Progressing Towards Goal     Problem: Spiritual Distress  Goal: Distress heard, acknowledged, and addressed  Description: Patient/family/caregiver distress will be heard, acknowledged, and addressed throughout hospice care. Outcome: Progressing Towards Goal     Problem: Breathing Pattern - Ineffective  Goal: *Use of effective breathing techniques  Outcome: Progressing Towards Goal     Problem: Grieving  Goal: *Able to express feelings of grief  Outcome: Progressing Towards Goal  Goal: *Able to identify stages of grieving process  Outcome: Progressing Towards Goal     Problem: Nausea/Vomiting (Adult)  Goal: *Absence of nausea/vomiting  Outcome: Progressing Towards Goal     Problem: Infection - Risk of, Central Venous Catheter-Associated Bloodstream Infection  Goal: *Absence of infection signs and symptoms  Outcome: Progressing Towards Goal     Problem: Infection - Risk of, Urinary Catheter-Associated Urinary Tract Infection  Goal: *Absence of infection signs and symptoms  Outcome: Progressing Towards Goal     Problem: End of Life Process  Goal: Demonstrate understanding of end of life processes  Description: Patient/caregiver will understand end of life processes. Outcome: Progressing Towards Goal     Problem: Dyspnea Due to End of Life  Goal: Demonstrate understanding of and ability to manage respiratory symptoms at end of life  Outcome: Progressing Towards Goal     Problem: Imminent Death  Goal: Collaborate with patient/family/caregiver/interdisciplinary team to minimize and manage end of life symptoms  Outcome: Progressing Towards Goal     Problem: Discharge Planning  Goal: *Participates in discharge planning  Outcome: Progressing Towards Goal     Problem: Falls - Risk of  Goal: *Absence of Falls  Description: Document Sherrill Fall Risk and appropriate interventions in the flowsheet.   Outcome: Progressing Towards Goal  Note: Fall Risk Interventions:  Mobility Interventions: Bed/chair exit alarm    Mentation Interventions: Bed/chair exit alarm, Adequate sleep, hydration, pain control, Door open when patient unattended, More frequent rounding, Room close to nurse's station    Medication Interventions: Bed/chair exit alarm    Elimination Interventions: Bed/chair exit alarm, Toileting schedule/hourly rounds              Problem: Patient Education: Go to Patient Education Activity  Goal: Patient/Family Education  Outcome: Progressing Towards Goal     Problem: Emotional Support Needs  Goal: Patient/family is receiving emotional support  Description: Pt, Caitlyn, and family will receive emotional support from SW weekly through weekly check-ins, education on the hospice philosophy, validation of feelings, rapport building, the use of a  if needed, and active listening throughout pt's hospice journey. Outcome: Progressing Towards Goal     Problem: Anticipatory Grief  Goal: Grief heard and acknowledged, anxiety reduced, patient coping identified, patient/family expressed gratitude  Description: GOAL:  Mendel and her children, Jayesh Raya will demonstrate appropriate anticipatory grief reactions related to Espinoza's impending death as evidenced by   their ability to verbalize feelings associated with grief such as denial, bargaining, anger, and depression. They will display feelings and associated behaviors in a healthy manner during inpatient hospice stay.    Outcome: Progressing Towards Goal

## 2021-12-11 NOTE — PROGRESS NOTES
Progress Note    Patient: Jose Raines MRN: [id number]  SSN: [id number]    YOB: 1960  Age: 62 y.o. Sex: female      Admit Date: 12/7/2021    LOS: 4 days     Subjective:     Moaning, agitated. Trying to get out of bed. Has required morphine x 3 IV for pain and geodon x 1 IM for agitation. Review of Systems:  Review of systems not obtained due to patient factors. Objective:     Vitals:    12/10/21 1544 12/10/21 1804 12/11/21 0521 12/11/21 1428   BP: 111/86  (!) 142/90 (!) 101/55   Pulse: 66  96 (!) 43   Resp: 14 15 14 20   Temp: 97.6 °F (36.4 °C)  96.8 °F (36 °C) (!) 96.4 °F (35.8 °C)   Weight:       Height:            Intake and Output:  Current Shift: No intake/output data recorded. Last three shifts: 12/09 1901 - 12/11 0700  In: -   Out: 500 [Urine:500]    Physical Exam:   GENERAL: mild distress, appears older than stated age, pale, cachectic, lethargic, restless  LUNG: Coarse breath sounds, diminished in the right lower lobe. Unlabored respirations.   HEART: regular rate and rhythm  ABDOMEN: soft, non-tender. Bowel sounds hypoactive. : Melo catheter with debbie urine.   EXTREMITIES:  extremities with no cyanosis or edema. + pulses.   SKIN: Pale. Warm to touch. Peripheral IV in left forearm with dressing intact.   NEUROLOGIC: Lethargic, restless. Nonverbal. Generalized weakness. Bedbound.   PSYCHIATRIC: agitated at times    Lab/Data Review:  No new labs resulted in the last 24 hours.     Assessment:     Principal Problem:    Metabolic encephalopathy (76/3/2508)    Active Problems:    Dementia in chronic schizophrenia ([address] (12/10/2021)      Dehydration, moderate (12/10/2021)      Senile debility (12/10/2021)      Severe protein-calorie malnutrition ([address] (12/10/2021)      Deaf, requires  (6/12/2014)      Aspiration pneumonia ([address] (12/3/2021)        Plan:     Current Facility-Administered Medications   Medication Dose Route Frequency    haloperidol lactate (HALDOL) injection 4 mg  4 mg IntraVENous Q8H    Or    haloperidol (HALDOL) 2 mg/mL oral solution 5 mg  5 mg SubLINGual Q8H    haloperidol lactate (HALDOL) injection 4 mg  4 mg SubCUTAneous Q2H PRN    Or    haloperidol lactate (HALDOL) injection 4 mg  4 mg IntraVENous Q2H PRN    ziprasidone (GEODON) 5 mg in sterile water (preservative free) 0.25 mL injection  5 mg IntraMUSCular Q6H PRN    LORazepam (ATIVAN) injection 0.5 mg  0.5 mg IntraVENous Q6H    sodium chloride (NS) flush 3 mL  3 mL IntraVENous Q12H    sodium chloride (NS) flush 3 mL  3 mL IntraVENous PRN    acetaminophen (TYLENOL) suppository 650 mg  650 mg Rectal Q3H PRN    bisacodyL (DULCOLAX) suppository 10 mg  10 mg Rectal PRN    glycopyrrolate (ROBINUL) injection 0.2 mg  0.2 mg IntraVENous Q4H PRN    morphine injection 2 mg  2 mg IntraVENous Q20MIN PRN    Or    morphine injection 2 mg  2 mg SubCUTAneous Q20MIN PRN     12/7: (Jocy) Admitted GIP with metabolic encephalopathy for management of pain, dyspnea, agitation.     1. Pain: Morphine 2mg IV/SQ Q20 minutes as needed.     2. Dyspnea: Morphine 2mg IV/SQ Q20 minutes as needed.  Glycopyrrolate 0.2mg q4 prn secretions. Oxygen at 2 L/min prn.     3. Agitation: Haldol 4mg IV Q8 or 5mg po Q8. Lorazepam 0.5mg IV/IM q6. Haloperidol 4mg IV/SQ Q2 hour prn.  Geodon 5mg IM Q4 prn.      4. Family/Pt Support: Family at bedside during exam. Medications and plan of care discussed with nursing staff and family. Will continue to monitor for symptoms and adjust medications as needed to maintain patient comfort.  PPS 10%. Case discussed with Dr. Benton Brought     Increase Ativan to 1mg IV Q8 for agitation.     Signed By: Scooter Tanner NP     December 11, 2021

## 2021-12-11 NOTE — PROGRESS NOTES
7353 Report received from off going RN. Pt was admitted on 12/7 [facility] level of care with hospice dx of sepsis and aspiration pneumonia with symptom management of pain, dyspnea and agitation. Pt was medicated overnight with three PRN doses of Morphine and one PRN dose of Geodon. Currently pt is resting quietly with her eyes closed, FLACC 0/10. Oxygen in place via NC at 2L. Safety measures in place with bed low and locked, call bell in reach, tab alert in place, side rails up x2. Melo to gravity drainage. Door remains open for continued monitoring. Pt is on an air mattress. Plan of care reviewed with CNA. D/C plan is for pt to remain at [facility] until her passing. Ongoing assessment will continue for appropriate level of care. 0830 Pt continues to rest with her eyes half closed, mouth wife open on room air. FLACC 0/10. Safety measures in place. Door remains open for continued monitoring. 0226 Pt's Ativan dose at this time given SQ as PIV site leaking and pulled. Unable to obtain new PIV site at this time, veins small and very hard. One unsuccessful attempt with #22. Safety measures in place. Door remains open for continued monitoring. 26 Pt's daughter is at the bedside, pt continues to rest comfortably with her eyes closed and FLACC 0/10. Safety measures in place. 1504 Pt medicated after attempts to comfort pt with repositioned didn't work. Daughter's at the bedside and pt aware of their presence and their attention to her pt is moaning and moving her arms non stop. Pt medicated with haldol 4mg SQ and Ativan 0.5mg SQ due to loss of PIV site. Pt repositioned again for comfort. Safety measures in place. Door remains open for continued monitoring. 1545 Pt is resting soundly with her eyes closed. Safety measures in place. Daughters have left [facility] to eat and will return. Door remains open for continued monitoring. 1700 Pt continues to rest soundly with FLACC 0/10. Daughters now at the bedside.  Safety measures in place. Report given to oncoming RN.

## 2021-12-11 NOTE — PROGRESS NOTES
190 Bedside report received from [facility], RN. Pt identified by name and . Pt admitted on 2021 with a diagnosis of Sepsis and aspiration pneumonia under [facility] care. Pt alert with extreme confusion. Complete care. Melo catheter in place draining yellow urine. Pt restless and moaning loudly. RR shallow and non labored. No NVD or SOB. FLACC 6/10. Bed in lowest and locked position with all safety measures in place. Pt is at [facility] level of care, no change to pt discharge plan. Pt to stay at [facility] until passing. Plan of care reviewed with CNA.  Pt given scheduled medication as per orders. Nurse noted facial grimacing and pt given PRN Morphine to manage pain. RR even and non labored. No signs of NVD or SOB. FLACC 0/10. Will continue to monitor.  Pt in bed groaning very loudly and wallowing around in bed. Pt given PRN Geodon to manage anxiety, agitation, and restlessness. RR shallow and non labored on room air. No signs of NVD or SOB. FLACC 6/10. Will continue to monitor for changes.  Pt resting comfortably in bed; no signs of pain or distress noted. RR even and non labored. No signs of NVD or SOB. FLACC 0/10. Will continue to monitor. 0009 Pt given scheduled medication as per orders. Pt resting comfortably in bed; no signs of pain or distress noted. RR even and non labored. No signs of NVD or SOB. FLACC 0/10. No current needs noted. Will continue to monitor. 2505 Pt given scheduled Lorazepam as ordered. Pt resting quietly in bed; no signs of pain or distress noted. RR even and non labored. No signs of NVD or SOB. FLACC 0/10. Will continue to monitor. 2515 Nurse noted facial grimacing and restlessness; Pt given PRN Morphine to manage pain. No signs of NVD or SOB. FLACC 0/10. No current needs noted. Will continue to monitor. 0603  Pt given scheduled medication as per orders. Pt resting comfortably in bed; no signs of pain or distress noted. RR even and non labored.  No signs of NVD or SOB. FLACC 0/10. No current needs noted. Will continue to monitor. 9806 Nurse noted facial grimacing and restlessness after patient's bath; Pt given PRN Morphine to manage pain. No signs of NVD or SOB. FLACC 0/10. No current needs noted. Will continue to monitor. Walking rounds completed with Silvia Montes RN.

## 2021-12-12 NOTE — PROGRESS NOTES
5541 Report received from off going RN. Pt was admitted on 12/7 GIP status with hospice dx of sepsis aspiration pneumonia and symptom management of pain, dyspnea, agitation. Pt received one dose of PRN Geodon overnight. Currently pt is resting with slight stirring but settles down quickly. RR even and unlabored at 14/min. Pt is on room air. Safety measures in place with bed low and locked, call bell in reach, tab alert in place, side rails up x2. Door remains open for continued monitoring. Plan of care reviewed with CNA. D/C plan is for pt to remains at [facility] until her passing. Ongoing assessment will continue for appropriate level of care. 2355 Pt moaning out and moving her head side to side. Haldol 4mg SQ given for symptom management. Safety measures in place. Door remains open for continued monitoring. 1501 Pt continues to moan, Morphine 2mg SQ given for symptom management. Safety measures in place. Door remains open for continued monitoring.    0821 Geodon removed and mixed as pt continues to moan and groan but as medication was mixing with sterile water pt settled down and now is quiet, resting comfortably with her eyes closed, FLACC 0/10. Geodon placed in refrigerator with pt label, date and time mixed. 5742 Sodium Chloride flush discontinued from orders as pt no longer has PIV site. 0945 Pt yelling out, groaning and moving her head back and forth. Pt medicated with Geodon 5mg IM in right thigh for symptom management. Also at this time, PIV started in left [facility] [place] with #22 gelco x1 attempt. Pt was repositioned onto her right side with pillow support. Shades up, lights off and room is quiet. Safety measures in place. Door remains open for continued monitoring. Currently no visitors at the bedside. 1030 Pt resting in bed with her eyes closed, FLACC 0/10. Safety measures in place. Door remains open for continued monitoring. 1240 Pt continues to rest without changes in condition.  FLACC 0/10, Safety measures in place. Door remains open for continued monitoring. Report given to Oscar Voss RN.

## 2021-12-12 NOTE — PROGRESS NOTES
Received report from Misael Aguilar RN. Pt Id by name and  during rounds.   Scheduled ativan 0.5 mg IM given. Pt tolerated well.   Pt lying in bed. No s/sx of distress. Flacc =0-1. Resp non labored on RA. Lungs diminished. HR reg. BS hypo. No edema noted. Melo cath draining orange urine. SR up x 2. Bed low/locked. Call light with in reach. Door opened. Pt is under GIP level of care. Will continue to assess need for change of level of care. Collaborate with IDG team regarding discharge planning. Pt is expected to  under GIP level of care. Reviewed care plan with CNA.   Pt repositioned in bed. No facial grimace. Flacc =0-1. Resp non labored on RA. Scheduled haldol 4 mg sq given. Pt tolerated well. SR up x 2. Bed low/locked. Call light with in reach. Door opened.   Pt repositioned. Scheduled ativan 1 mg IM given. 2347  Pt crying out. Restless agitated. No facial grimace. Pt repositioned in bed. Geodon 5 mg IM given. Resp non labored on RA. SR up x 2. Bed low/locked. Call light with in reach. Door opened. 0128  Pt repositioned in bed. No facial grimace. Flacc =0-1. Resp non labored on RA. Scheduled haldol 4 mg sq given. Pt tolerated well. SR up x 2. Bed low/locked. Call light with in reach. Door opened. 0324  Pt with eyes closed. No facial grimace. Flacc =0-1. No crying out. No agitation. Resp shallow non labored on RA. SR up x 2. Bed low/locked. Call light with in reach. Door opened. 0078  Pt resting comfortably. No crying out. No facial grimace. No agitation. Flacc =0-1. Resp non labored shallow on RA. Scheduled ativan 1 mg IM and haldol 4 mg SQ given. Pt tolerated well. SR up x 2. Bed low/locked. Call light with in reach. Door opened. Report given to Misael Aguilar RN.

## 2021-12-12 NOTE — PROGRESS NOTES
Problem: Pressure Injury - Risk of  Goal: *Prevention of pressure injury  Description: Document Brendon Scale and appropriate interventions in the flowsheet. Outcome: Progressing Towards Goal  Note: Pressure Injury Interventions:  Sensory Interventions: Assess changes in LOC, Pressure redistribution bed/mattress (bed type), Minimize linen layers, Keep linens dry and wrinkle-free, Check visual cues for pain, Pad between skin to skin    Moisture Interventions: Absorbent underpads, Maintain skin hydration (lotion/cream), Minimize layers, Apply protective barrier, creams and emollients, Moisture barrier, Internal/External urinary devices, Limit adult briefs    Activity Interventions: Pressure redistribution bed/mattress(bed type)    Mobility Interventions: Pressure redistribution bed/mattress (bed type)    Nutrition Interventions: Document food/fluid/supplement intake    Friction and Shear Interventions: Apply protective barrier, creams and emollients, Minimize layers, Lift sheet                Problem: Potential for Alteration in Skin Integrity  Goal: Monitor skin for areas of alteration in skin integrity  Description: Patient/family/caregiver will demonstrate ability to care for patient's skin, monitor for areas of breakdown, and demonstrate methods to prevent breakdown during hospice care. Outcome: Progressing Towards Goal     Problem: Risk for Falls  Goal: Free of falls during inpatient stay  Description: Patient will be free of falls during inpatient stay. Outcome: Progressing Towards Goal     Problem: Alteration in Mobility  Goal: Remain as independent as possible and remain safe in environment  Description: Patient will remain as independent as possible and remain safe in their environment.   Outcome: Progressing Towards Goal     Problem: Pain  Goal: Assess satisfaction of level of comfort and symptom control  Outcome: Progressing Towards Goal  Goal: *Control of acute pain  Outcome: Progressing Towards Goal Problem: Anxiety/Agitation  Goal: Verbalize or staff assess the ability to manage anxiety  Description: The patient/family/caregiver will verbalize and demonstrate ability to manage the patient's anxiety throughout hospice care. Outcome: Progressing Towards Goal     Problem: Breathing Pattern - Ineffective  Goal: *Use of effective breathing techniques  Outcome: Progressing Towards Goal     Problem: Nausea/Vomiting (Adult)  Goal: *Absence of nausea/vomiting  Outcome: Progressing Towards Goal     Problem: Infection - Risk of, Central Venous Catheter-Associated Bloodstream Infection  Goal: *Absence of infection signs and symptoms  Outcome: Progressing Towards Goal     Problem: Infection - Risk of, Urinary Catheter-Associated Urinary Tract Infection  Goal: *Absence of infection signs and symptoms  Outcome: Progressing Towards Goal     Problem: Dyspnea Due to End of Life  Goal: Demonstrate understanding of and ability to manage respiratory symptoms at end of life  Outcome: Progressing Towards Goal     Problem: Falls - Risk of  Goal: *Absence of Falls  Description: Document Trena Bro Fall Risk and appropriate interventions in the flowsheet.   Outcome: Progressing Towards Goal  Note: Fall Risk Interventions:  Mobility Interventions: Bed/chair exit alarm    Mentation Interventions: Adequate sleep, hydration, pain control, Bed/chair exit alarm, Door open when patient unattended, Update white board    Medication Interventions: Bed/chair exit alarm, Evaluate medications/consider consulting pharmacy    Elimination Interventions: Bed/chair exit alarm, Call light in reach

## 2021-12-12 NOTE — PROGRESS NOTES
1800 BSSR received from Linda English RN. Patient identified by name and . Patient admitted under [facility] level of care with diagnosis of sepsis aspiration pneumonia and alzheimer's disease with behavioral disturbances for management of pain, dyspnea, and agitation. Patient is lying in bed with eyes closed moaning and turning her head back and forth. She appears restless in bed and her body posture appears tense and brow is furrowed. No signs or symptoms of dyspnea, or nausea/vomiting observed but she does appear agitated and in pain. Patient is currently on room air and does not appear to be in respiratory distress at this time. Melo to gravity drain with debbie urine draining. FLAAC 4/10. Patient's daughter is at her bedside providing support, she states she has calmed down since she was medicated around six. Encouraged daughter to use her call light if her mother begins to look like she is in distress with agreement voiced. Patient repositioned for comfort. Reviewed plan of care with CNA for this shift with understanding voiced. Discharge Plan is to remain at [facility] until she meets her demise. Reassessment of proper LOC will be done on an ongoing basis. Safety measures in place including, door open for continuous monitoring, bed in low locked position, tab alert in place, call light within reach, and side rails up x2. Will continue to monitor for changes, safety, and needs.  Patient is lying in bed after her daughter left for the evening moaning and yelling out loud enough to be heard down the hallway at the farther nursing station. She has also become so restless she has slid down the bed to the left side. Geodon 5mg IM given per prn order for anxiety/agitation. Patient repositioned for comfort and floated with pillows. Safety measures in place including, door open for continuous monitoring, bed in low locked position, tab alert in place, call light within reach, and side rails up x2.  Will continue to monitor for changes, safety, and needs. FLACC 7/10.     2001 Patient is resting in bed with calm and relaxed facial features and general posture. She is no longer moaning and yelling out. She appears to be comfortable and calm. Safety measures in place including, door open for continuous monitoring, bed in low locked position, tab alert in place, call light within reach, and side rails up x2. Will continue to monitor for changes, safety, and needs. FLACC 0/10.    3933 Pt observed on rounds, she is resting quietly with eyes closed and respirations even and shallow. No facial grimacing, moaning, or signs of agitation observed. All symptoms managed at this time. No acute changes noted. FLACC 0/10. Safety measures remain in place, will continue to monitor for changes, safety, and comfort. 0005 Patient continues to rest quietly. She is no longer moaning and she appears comfortable and quiet. All of her symptoms appear to be managed at this time. Safety measures in place including, door open for continuous monitoring, bed in low locked position, tab alert in place, call light within reach, and side rails up x2. Will continue to monitor for changes, safety, and needs. FLACC 0/10.    8043 Patient is becoming increasingly restless. She is moaning and it has been becoming louder the longer she keeps going. She has a furrowed brow and she appears to be more tense as she becomes more restless. Patient given her scheduled Ativan 1mg SC given per prn order and her scheduled Haldol 4mg SC given per MD order. Safety measures in place including, door open for continuous monitoring, bed in low locked position, tab alert in place, call light within reach, and side rails up x2. Will continue to monitor for changes, safety, and needs. FLACC 4/10.    0112 Patient lying in bed with respirations even and shallow. Patiet appears to be comfortable and is not restless in the bed any longer.  Safety measures in place including, door open for continuous monitoring, bed in low locked position, tab alert in place, call light within reach, and side rails up x2. Will continue to monitor for changes, safety, and needs. FLACC 2/10.    0301 Pt observed on rounds, she is resting quietly with eyes closed and respirations even and shallow. No facial grimacing, moaning, or signs of agitation observed. All symptoms managed at this time. No acute changes noted. FLACC 0/10. Safety measures remain in place, will continue to monitor for changes, safety, and comfort. 8791 Patient is moaning and crying out loudly that can be heard across the building. Geodon 5mg IM given per prn order for agitation. Patient tolerated this well. Safety measures in place including, door open for continuous monitoring, bed in low locked position, tab alert in place, call light within reach, and side rails up x2. Will continue to monitor for changes, safety, and needs. FLACC 0/10.    0510 Patient resting quietly in bed she appears to be comfortable and is no longer moaning or crying out. Safety measures in place including, door open for continuous monitoring, bed in low locked position, tab alert in place, call light within reach, and side rails up x2. Will continue to monitor for changes, safety, and needs. FLACC 1/10.    0600 Patient lying in bed floated on pillows for comfort and skin integrity. Patient is not opening her eyes unless she is being stimulated by care being provided. Patient moves around the bed when agitated or restless but is not needing to be repositioned at this time. Scheduled Ativan and Haldol given per MD order without difficulty. Patient appears to be comfortable and is not moaning or crying out at this time. Safety measures in place including, door open for continuous monitoring, bed in low locked position, tab alert in place, call light within reach, and side rails up x2. Will continue to monitor for changes, safety, and needs. FLACC 0/10.     Report given to oncoming RN

## 2021-12-13 NOTE — [facility] IDG SOCIAL WORKER NOTES
Patient: Calista Portal    Date: 12/13/21  Time: 10:37 AM    [facility]  Notes  Pt remains GIP LOC. No changes in the plan of care. SW will continue to provide ongoing emotional support and assessment of psychosocial and bereavement concerns, as well as needs until discharge.          Signed by: Gilmar Chavarria LMSW

## 2021-12-13 NOTE — [facility] IDG CHAPLAIN NOTES
Patient: Manny Qureshi    Date: 12/13/21  Time: 10:35 AM    \[facility] [place] [facility]\""  Notes    Intervention: Ministry of presence, initial assessments completed, family care with active listening and education, compassion and prayer. Outcome  Meaningful time with family. Plan: Continue to provide spiritual and emotional support. GOAL: Mendel and her children, Clydene Osler will demonstrate appropriate anticipatory grief reactions related to Espinoza's impending death as evidenced by their ability to verbalize feelings associated with grief such as denial, bargaining, anger, and depression. They will display feelings and associated behaviors in a healthy manner during inpatient hospice stay. Interventions:  will assess grief reactions with each visit.  will provide opportunities for meaningful, nonjudgmental conversations and education.                      Signed by: Yefri Montes        Signed by: Yefri Montes

## 2021-12-13 NOTE — PROGRESS NOTES
Problem: Pressure Injury - Risk of  Goal: *Prevention of pressure injury  Description: Document Brendon Scale and appropriate interventions in the flowsheet. Outcome: Progressing Towards Goal  Note: Pressure Injury Interventions:  Sensory Interventions: Assess changes in LOC, Pressure redistribution bed/mattress (bed type), Minimize linen layers, Keep linens dry and wrinkle-free, Check visual cues for pain, Pad between skin to skin    Moisture Interventions: Absorbent underpads, Maintain skin hydration (lotion/cream), Minimize layers, Apply protective barrier, creams and emollients, Moisture barrier, Internal/External urinary devices, Limit adult briefs    Activity Interventions: Pressure redistribution bed/mattress(bed type)    Mobility Interventions: Pressure redistribution bed/mattress (bed type)    Nutrition Interventions: Document food/fluid/supplement intake    Friction and Shear Interventions: Apply protective barrier, creams and emollients, Minimize layers, Lift sheet                Problem: Risk for Falls  Goal: Free of falls during inpatient stay  Description: Patient will be free of falls during inpatient stay. Outcome: Progressing Towards Goal     Problem: Pain  Goal: Assess satisfaction of level of comfort and symptom control  Outcome: Progressing Towards Goal  Goal: *Control of acute pain  Outcome: Progressing Towards Goal     Problem: Anxiety/Agitation  Goal: Verbalize or staff assess the ability to manage anxiety  Description: The patient/family/caregiver will verbalize and demonstrate ability to manage the patient's anxiety throughout hospice care. Outcome: Progressing Towards Goal     Problem: Communication Deficit  Goal: Effectively communicate symptoms, needs, and concerns  Description: Patient/family/caregiver will effectively communicate symptoms, needs and concerns.   Outcome: Progressing Towards Goal     Problem: Infection - Risk of, Urinary Catheter-Associated Urinary Tract Infection  Goal: *Absence of infection signs and symptoms  Outcome: Progressing Towards Goal

## 2021-12-13 NOTE — PROGRESS NOTES
0700- Report received, patient resting quietly in bed with no s/sx pain, dyspnea, agitation, or distress. Call light within reach. Bed is low and locked, sr up x 3, tab alert in place, and door left open for continuous monitoring. Patient is currently under GIP level of care appropriately, and expected to pass at [facility]. Will continue to assess need for change in LOC / discharge and collaborate with IDG team accordingly. Care plan reviewed with CNA.     9930- no s/sx of pain or distress. Continues to sleep. 1054- no s/sx of pain or distress. Continues to sleep. Scheduled ativan and haldol given iv. Patient turned and repositioned. Melo leaking and fabricio care performed. Brief left off. allevyn changed to sacrum and R hip.   patient floated on pillows. 1124- patient awakened with care and is grimacing with mouth wide open, body tense. Prn haldol and morphine given for signs of pain and agitation. flacc 7/10.    1151- HAMMAD lanza notified this writer that patient is moaning. Prn geodon 10 mg im given per new orders. Patient continues to grimace and is now moaning. 1235- Patient resting with eyes closed. No s/sx of pain or distress. flacc 0/10.    1315- no s/sx of pain or distress. 1510- no s/sx of pain or distress. Pillow removed from R side. 1654- patient starting to moan. Scheduled haldol and ativan administered. Patient turned to R side. allevyn changed to L hip. Mouth care provided with moisterizer and water. Patient performing swallowing motion, but does not close mouth fully. 1721- prn geodon given as prn haldol and ativan have not resolved symptoms. 1745- eyes closed, face relaxed, symptoms resolved. 0- daughters at bedside asking for an update. Update given. Mouth swabbed and patient swallowed, but did not close mouth. Daughter at bedside said that patient often cries when she wants something ot eat or drink. 2037- patient starting to moan, face relaxed.  Swabbed mouth with water and patient swallowing, but with difficulty. 2046- prn haldol given iv for agitation. IV difficult to flush and has been difficult throughout shift. Removed as it is leaking. 2056- patient mouth swabbed with water and she is trying to clamp her mouth on it. prn morphine 2 mg given sq for signs of pain. Patient moaning and grimacing. Flacc 7/10. Swabbed mouth again with water, but patient moaning with swabbing. Patient turned to L side.     2130- Report given to Karina Banerjee\[facility] [place] [facility]\"" Elder

## 2021-12-13 NOTE — PROGRESS NOTES
Progress Note    Patient: Darryn Dumont MRN: [id number]  SSN: [id number]    YOB: 1960  Age: 62 y.o. Sex: female      Admit Date: 12/7/2021    LOS: 6 days     Subjective:     Lethargic, agitated. NPO. Has required geodon x 3 for agitation, morphine x 2 for pain. Review of Systems:  Review of systems not obtained due to patient factors. Objective:     Vitals:    12/11/21 1428 12/12/21 0437 12/12/21 1704 12/13/21 0552   BP: (!) 101/55 (!) 79/48 138/83 (!) 147/93   Pulse: (!) 43 (!) 52 (!) 113 100   Resp: 20 18 18 16   Temp: (!) 96.4 °F (35.8 °C) 96.8 °F (36 °C) 97.1 °F (36.2 °C) 97.3 °F (36.3 °C)   Weight:       Height:            Intake and Output:  Current Shift: No intake/output data recorded. Last three shifts: 12/11 1901 - 12/13 0700  In: -   Out: 675 [Urine:675]    Physical Exam:   GENERAL: mild distress, appears older than stated age, pale, cachectic, lethargic  LUNG: Coarse breath sounds, diminished in the right lower lobe. Unlabored respirations.   HEART: regular rate and rhythm  ABDOMEN: soft, non-tender. Bowel sounds hypoactive. : Melo catheter with debbie urine.   EXTREMITIES:  extremities with no cyanosis or edema. + pulses.   SKIN: Pale. Warm to touch.   NEUROLOGIC: Lethargic, restless. Nonverbal. Generalized weakness. Bedbound.   PSYCHIATRIC: agitated      Lab/Data Review:  No new labs resulted in the last 24 hours.     Assessment:     Principal Problem:    Metabolic encephalopathy (89/0/5257)    Active Problems:    Dementia in chronic schizophrenia ([address] (12/10/2021)      Dehydration, moderate (12/10/2021)      Senile debility (12/10/2021)      Severe protein-calorie malnutrition ([address] (12/10/2021)      Deaf, requires  (6/12/2014)      Aspiration pneumonia ([address] (12/3/2021)        Plan:     Current Facility-Administered Medications   Medication Dose Route Frequency    haloperidol lactate (HALDOL) injection 5 mg  5 mg IntraVENous Q6H    ziprasidone (GEODON) 10 mg in sterile water (preservative free) 0.5 mL injection  10 mg IntraMUSCular Q6H PRN    haloperidol lactate (HALDOL) injection 5 mg  5 mg SubCUTAneous Q2H PRN    Or    haloperidol lactate (HALDOL) injection 5 mg  5 mg IntraVENous Q2H PRN    sodium chloride (NS) flush 3 mL  3 mL IntraVENous BID    LORazepam (ATIVAN) injection 1 mg  1 mg IntraVENous Q6H    acetaminophen (TYLENOL) suppository 650 mg  650 mg Rectal Q3H PRN    bisacodyL (DULCOLAX) suppository 10 mg  10 mg Rectal PRN    glycopyrrolate (ROBINUL) injection 0.2 mg  0.2 mg IntraVENous Q4H PRN    morphine injection 2 mg  2 mg IntraVENous Q20MIN PRN    Or    morphine injection 2 mg  2 mg SubCUTAneous Q20MIN PRN       12/7: (Jocy) Admitted GIP with metabolic encephalopathy for management of pain, dyspnea, agitation.     1. Pain: Morphine 2mg IV/SQ Q20 minutes as needed.     2. Dyspnea: Morphine 2mg IV/SQ Q20 minutes as needed.  Glycopyrrolate 0.2mg q4 prn secretions. Oxygen at 2 L/min prn.     3. Agitation: Haldol 4mg IV Q8 or 5mg po Q8. Lorazepam 1mg IV/IM q6.  Haloperidol 4mg IV/SQ Q2 hour prn.  Geodon 5mg IM Q4 prn.      4. Family/Pt Support: Family at bedside during exam. Medications and plan of care discussed with nursing staff and family. Will continue to monitor for symptoms and adjust medications as needed to maintain patient comfort.  PPS 10%. Case discussed with Dr. Howard Millard and in [facility] meeting today.      Increase Geodon to 10mg Q6 prn if haldol ineffective for agitation. Increase haldol to 5mg IV/SQ Q6 and Q2 prn for agitation.      Signed By: Tim Voss NP     December 13, 2021

## 2021-12-14 NOTE — PROGRESS NOTES
2115:  Patient report from Mariana Archer RN. Patient ID by name and . Patient is GIP LOC for hospice diagnosis of metabolic encephalopathy to manage symptoms of pain, dyspnea, and agitation. Discharge plan is for patient to remain in [facility] until demise. Discharge plans to be evaluated for potential LOC change. RN reviewed POC with CNA. Patient is currently moaning out but was just previously medicated by Mariana Archer RN. Will assess effectiveness of PRN medications in 30 minutes and medicate patient as needed if moaning out does not cease. Bed low and locked and all safety measures in place. Door open. :  Patient continues to moan out loudly. Too soon for Geodon and Haldol. Medicated with PRN Morphine 2 mg SQ as ordered for pain for FLACC 5/10. Will reassess. CNA at bedside giving patient bath. Assessment complete. 2239:  PRN Geodon IM given as patient is continuing to moan out very loudly despite Morphine and Haldol administration. Will reassess. 2315:  Reassessment:  Patient now resting with no moaning heard. No s/sx of pain observed. 2353:  Scheduled Lorazepam IM and Haldol SQ given as ordered. 0130:  Patient sleeping. No s/sx of pain, dyspnea, or agitation observed. FLACC 0/10.    0340:  Patient sleeping. No s/sx of pain, dyspnea, or agitation observed. FLACC 0/10.    0400:  Patient heard moaning out loudly. Moving head from side to side. Medicated with PRN Geodon IM for agitation. Patient repositioned for comfort with CNA assist.  Will reassess. 4906:  Reassessment:  Patient now resting quietly. No s/sx of agitation observed. 0542:  Scheduled Haldol SQ and Lorazepam IM administered as ordered. Patient has required one PRN dose of Morphine and two PRN doses of Geodon this shift to manage pain and agitation. Report to Morena Huerta RN.

## 2021-12-14 NOTE — PROGRESS NOTES
Problem: Pressure Injury - Risk of  Goal: *Prevention of pressure injury  Description: Patient Mendel Antony Alice will remain free from acquired pressure injuries AEB zero acquired pressure injuries during assessment of skin each shift during inpatient hospice stay. Outcome: Progressing Towards Goal  Note: Pressure Injury Interventions:  Sensory Interventions: Assess changes in LOC, Avoid rigorous massage over bony prominences, Check visual cues for pain, Float heels, Minimize linen layers    Moisture Interventions: Absorbent underpads, Apply protective barrier, creams and emollients, Limit adult briefs, Minimize layers, Moisture barrier    Activity Interventions: Pressure redistribution bed/mattress(bed type)    Mobility Interventions: Pressure redistribution bed/mattress (bed type), Float heels, HOB 30 degrees or less    Nutrition Interventions: Document food/fluid/supplement intake    Friction and Shear Interventions: Apply protective barrier, creams and emollients, Lift sheet, Minimize layers                Problem: Pain  Goal: Assess satisfaction of level of comfort and symptom control  Description: Patient Mendel Antony Alice will exhibit decrease in pain AEB rating pain less than 3 on 1-10 scale or 3 FLACC score within each shift of receiving pain medication during inpatient hospice stay. Outcome: Progressing Towards Goal     Problem: Anxiety/Agitation  Goal: Verbalize or staff assess the ability to manage anxiety  Description: Patient Mendel Antony Alice will demonstrate appropriate motor behavior AEB less than 2 episodes of fidgety, picking or pulling at clothes on devices, yelling out, getting out of bed, etc. each shift during inpatient hospice stay.    Outcome: Progressing Towards Goal     Problem: Infection - Risk of, Urinary Catheter-Associated Urinary Tract Infection  Goal: *Absence of infection signs and symptoms  Description: Patient Mendel Antony Alice will remain free from infection R/T urinary catheter AEB absence of signs and symptoms of infection each shift during inpatient hospice stay. Outcome: Progressing Towards Goal     Problem: Dyspnea Due to End of Life  Goal: Demonstrate understanding of and ability to manage respiratory symptoms at end of life  Description: Patient Mendel Elva Pata will indicate effective breathing pattern AEB absence of respiratory distress each shift during inpatient hospice stay. Outcome: Progressing Towards Goal     Problem: Falls - Risk of  Goal: *Absence of Falls  Description: Patient Mendel Elva Pata will remain free from falls AEB no injuries r/t falls each shift during inpatient hospice stay.     Outcome: Progressing Towards Goal  Note: Fall Risk Interventions:  Mobility Interventions: Bed/chair exit alarm    Mentation Interventions: Door open when patient unattended, Bed/chair exit alarm, Adequate sleep, hydration, pain control, Evaluate medications/consider consulting pharmacy    Medication Interventions: Bed/chair exit alarm, Evaluate medications/consider consulting pharmacy    Elimination Interventions: Bed/chair exit alarm, Toileting schedule/hourly rounds

## 2021-12-14 NOTE — PROGRESS NOTES
Problem: Pressure Injury - Risk of  Goal: *Prevention of pressure injury  Description: Patient Mendel Sabra Gross will remain free from acquired pressure injuries AEB zero acquired pressure injuries during assessment of skin each shift during inpatient hospice stay. Outcome: Progressing Towards Goal  Note: Pressure Injury Interventions:  Sensory Interventions: Assess changes in LOC, Avoid rigorous massage over bony prominences, Check visual cues for pain, Float heels, Minimize linen layers    Moisture Interventions: Absorbent underpads, Apply protective barrier, creams and emollients, Limit adult briefs, Minimize layers, Moisture barrier    Activity Interventions: Pressure redistribution bed/mattress(bed type)    Mobility Interventions: Pressure redistribution bed/mattress (bed type), Float heels, HOB 30 degrees or less    Nutrition Interventions: Document food/fluid/supplement intake    Friction and Shear Interventions: Apply protective barrier, creams and emollients, Lift sheet, Minimize layers     Problem: Risk for Falls  Goal: Free of falls during inpatient stay  Description: Patient Mendel Sabra Gross will remain free from falls during shift. Tab alert x 2, room close to nurse's station. Outcome: Progressing Towards Goal     Problem: Pain  Goal: Assess satisfaction of level of comfort and symptom control  Patient Mendel Sabra Gross will exhibit decrease in pain AEB rating less than 2/10 on flacc scale within each shift of receiving pain medication during inpatient hospice stay.     Interventions:  Pain medication prn    Outcome: Progressing Towards Goal     Problem: Anxiety/Agitation  Goal: Verbalize or staff assess the ability to manage anxiety  Description: Patient Mendel Sabra Gross will demonstrate appropriate motor behavior AEB less than 2 episodes of fidgety, picking or pulling at clothes on devices, yelling out, getting out of bed, etc. each shift during inpatient hospice stay.      Interventions:  Scheduled agitation medication and prn agitation medication. Outcome: Progressing Towards Goal     Problem: Infection - Risk of, Urinary Catheter-Associated Urinary Tract Infection  Goal: *Absence of infection signs and symptoms during stay for Ms. Martino. Interventions: Melo care q shift.    Outcome: Progressing Towards Goal

## 2021-12-14 NOTE — PROGRESS NOTES
Progress Note    Patient: Manny Qureshi MRN: [id number]  SSN: [id number]    YOB: 1960  Age: 62 y.o. Sex: female      Admit Date: 12/7/2021    LOS: 7 days     Subjective:     Lethargic, agitated. NPO. Has required Morphine x 3 for pain, Geodon x 5 IM and haldol x 2 SQ for agitation. Review of Systems:  Review of systems not obtained due to patient factors. Objective:     Vitals:    12/07/21 2200 12/13/21 0552 12/13/21 1600 12/14/21 0501   BP:  (!) 147/93 (!) 94/57 (!) 135/95   Pulse:  100 83 84   Resp:  16 12 14   Temp:  97.3 °F (36.3 °C) (!) 95.4 °F (35.2 °C) (!) 96.1 °F (35.6 °C)   Weight: 38.6 kg (85 lb)      Height: 5' 2\" (1.575 m)           Intake and Output:  Current Shift: No intake/output data recorded. Last three shifts: 12/12 1901 - 12/14 0700  In: -   Out: 400 [Urine:400]    Physical Exam:   GENERAL: mild distress, appears older than stated age, pale, cachectic, lethargic, moaning. LUNG: Coarse breath sounds, diminished in the right lower lobe. Unlabored respirations.   HEART: regular rate and rhythm  ABDOMEN: soft, non-tender. Bowel sounds hypoactive. : Melo catheter with debbie urine.   EXTREMITIES:  extremities with no cyanosis or edema. + pulses.   SKIN: Pale. Warm to touch.   NEUROLOGIC: Lethargic, restless. Nonverbal. Generalized weakness. Bedbound.   PSYCHIATRIC: agitated     Lab/Data Review:  No new labs resulted in the last 24 hours.     Assessment:     Principal Problem:    Metabolic encephalopathy (13/8/8675)    Active Problems:    Dementia in chronic schizophrenia ([facility] (12/10/2021)      Dehydration, moderate (12/10/2021)      Senile debility (12/10/2021)      Severe protein-calorie malnutrition ([address] (12/10/2021)      Deaf, requires  (6/12/2014)      Aspiration pneumonia ([facility] (12/3/2021)        Plan:     Current Facility-Administered Medications   Medication Dose Route Frequency    haloperidol lactate (HALDOL) injection 5 mg 5 mg IntraVENous Q6H    ziprasidone (GEODON) 10 mg in sterile water (preservative free) 0.5 mL injection  10 mg IntraMUSCular Q6H PRN    haloperidol lactate (HALDOL) injection 5 mg  5 mg SubCUTAneous Q2H PRN    Or    haloperidol lactate (HALDOL) injection 5 mg  5 mg IntraVENous Q2H PRN    sodium chloride (NS) flush 3 mL  3 mL IntraVENous BID    LORazepam (ATIVAN) injection 1 mg  1 mg IntraVENous Q6H    acetaminophen (TYLENOL) suppository 650 mg  650 mg Rectal Q3H PRN    bisacodyL (DULCOLAX) suppository 10 mg  10 mg Rectal PRN    glycopyrrolate (ROBINUL) injection 0.2 mg  0.2 mg IntraVENous Q4H PRN    morphine injection 2 mg  2 mg IntraVENous Q20MIN PRN    Or    morphine injection 2 mg  2 mg SubCUTAneous Q20MIN PRN     12/7: (J[place]) Admitted GIP with metabolic encephalopathy for management of pain, dyspnea, agitation.     1. Pain: Morphine 2mg IV/SQ Q20 minutes as needed.     2. Dyspnea: Morphine 2mg IV/SQ Q20 minutes as needed.  Glycopyrrolate 0.2mg q4 prn secretions. Oxygen at 2 L/min prn.     3. Agitation: Haldol 5mg IV/SQ Q6. Lorazepam 1mg IV/IM q6.  Haloperidol 5mg IV/SQ Q2 hour prn.  Geodon 10mg IM Q4 prn.      4. Family/Pt Support: Family at bedside during exam. Medications and plan of care discussed with nursing staff and family. Will continue to monitor for symptoms and adjust medications as needed to maintain patient comfort.  PPS 10%. Case discussed with Dr. Sudhir Hendrickson     IV access lost. Will change ativan to 1mg SL Q6 to avoid IM shots due to low BMI. Add Zyprexa zydis sublingal 5mg Q12 for agitation. Continue haldol 5mg SQ Q6.      Signed By: Praful Goodman NP     December 14, 2021

## 2021-12-15 NOTE — PROGRESS NOTES
Problem: Pressure Injury - Risk of  Goal: *Prevention of pressure injury  Description: Patient Mendel Medora Buzzard will remain free from acquired pressure injuries AEB zero acquired pressure injuries during assessment of skin each shift during inpatient hospice stay. Outcome: Progressing Towards Goal  Note: Pressure Injury Interventions:  Sensory Interventions: Assess changes in LOC, Float heels, Keep linens dry and wrinkle-free    Moisture Interventions: Absorbent underpads, Internal/External urinary devices, Limit adult briefs    Activity Interventions: Pressure redistribution bed/mattress(bed type)    Mobility Interventions: Float heels, Pressure redistribution bed/mattress (bed type)    Nutrition Interventions: Document food/fluid/supplement intake, Offer support with meals,snacks and hydration    Friction and Shear Interventions: Apply protective barrier, creams and emollients, HOB 30 degrees or less, Minimize layers                Problem: Risk for Falls  Goal: Free of falls during inpatient stay  Description: Patient will be free of falls during inpatient stay. Outcome: Progressing Towards Goal     Problem: Alteration in Mobility  Goal: Remain as independent as possible and remain safe in environment  Description: Patient will remain as independent as possible and remain safe in their environment. Outcome: Progressing Towards Goal     Problem: Pain  Goal: Assess satisfaction of level of comfort and symptom control  Description: Patient Mendel Medora Buzzard will exhibit decrease in pain AEB rating pain less than 3 on 1-10 scale or 3 FLACC score within each shift of receiving pain medication during inpatient hospice stay.    Outcome: Progressing Towards Goal  Goal: *Control of acute pain  Outcome: Progressing Towards Goal     Problem: Anticipatory Grief  Goal: Explore reactions to and verbalize acceptance of impending loss  Description: Patient/family/caregiver will explore reactions to and verbalize acceptance of impending loss. Outcome: Progressing Towards Goal     Problem: Anxiety/Agitation  Goal: Verbalize or staff assess the ability to manage anxiety  Description: Patient Mendel Thana Rides will demonstrate appropriate motor behavior AEB less than 2 episodes of fidgety, picking or pulling at clothes on devices, yelling out, getting out of bed, etc. each shift during inpatient hospice stay. Outcome: Progressing Towards Goal     Problem: Communication Deficit  Goal: Effectively communicate symptoms, needs, and concerns  Description: Patient/family/caregiver will effectively communicate symptoms, needs and concerns. Outcome: Progressing Towards Goal     Problem: Coping and Emotional Distress  Goal: Demonstrate acceptance of terminal illness and understanding of disease progression  Description: Patient/family/caregiver will demonstrate acceptance of terminal disease and understanding of disease progression while employing appropriate coping mechanisms. Outcome: Progressing Towards Goal     Problem: Spiritual Distress  Goal: Distress heard, acknowledged, and addressed  Description: Patient/family/caregiver distress will be heard, acknowledged, and addressed throughout hospice care.   Outcome: Progressing Towards Goal     Problem: Breathing Pattern - Ineffective  Goal: *Use of effective breathing techniques  Outcome: Progressing Towards Goal     Problem: Grieving  Goal: *Able to express feelings of grief  Outcome: Progressing Towards Goal  Goal: *Able to identify stages of grieving process  Outcome: Progressing Towards Goal     Problem: Nausea/Vomiting (Adult)  Goal: *Absence of nausea/vomiting  Outcome: Progressing Towards Goal     Problem: Infection - Risk of, Urinary Catheter-Associated Urinary Tract Infection  Goal: *Absence of infection signs and symptoms  Description: Patient Mendel Thana Rides will remain free from infection R/T urinary catheter AEB absence of signs and symptoms of infection each shift during inpatient hospice stay. Outcome: Progressing Towards Goal     Problem: End of Life Process  Goal: Demonstrate understanding of end of life processes  Description: Patient/caregiver will understand end of life processes. Outcome: Progressing Towards Goal     Problem: Dyspnea Due to End of Life  Goal: Demonstrate understanding of and ability to manage respiratory symptoms at end of life  Description: Patient Mendel Catheryn Post will indicate effective breathing pattern AEB absence of respiratory distress each shift during inpatient hospice stay. Outcome: Progressing Towards Goal     Problem: Imminent Death  Goal: Collaborate with patient/family/caregiver/interdisciplinary team to minimize and manage end of life symptoms  Outcome: Progressing Towards Goal     Problem: Falls - Risk of  Goal: *Absence of Falls  Description: Patient Mendel Catheryn Post will remain free from falls AEB no injuries r/t falls each shift during inpatient hospice stay.     Outcome: Progressing Towards Goal  Note: Fall Risk Interventions:  Mobility Interventions: Bed/chair exit alarm    Mentation Interventions: Adequate sleep, hydration, pain control, Bed/chair exit alarm, Door open when patient unattended    Medication Interventions: Bed/chair exit alarm    Elimination Interventions: Bed/chair exit alarm, Call light in reach, Toileting schedule/hourly rounds              Problem: Patient Education: Go to Patient Education Activity  Goal: Patient/Family Education  Outcome: Progressing Towards Goal

## 2021-12-15 NOTE — PROGRESS NOTES
Follow Up: Ms. Ro Encinas was lying on her side with eyes open and mouth wide open. She was moaning.  did not attempt to speak with her but rather came back out and reported to [facility], [address] [place] [address]. She said she was going in to provide medication.

## 2021-12-15 NOTE — PROGRESS NOTES
Bedside report received from off-going RN visual identification made, assumed care of pt. Pt resting quietly with eyes closed, no agitation or restlessness, no grimacing or groaning. Pt respirations unlabored. Tab alert in place, rails up x 2, bed in lowest position, safety maintained. FLACC 0. Pt is at GIP level of care, no change to pt discharge plan. Pt to stay at [facility] until passing. Plan of care reviewed with CNA. 1919-Scheduled Haldol, Ativan, and Zyprexa given at this time. Pt is resting with eyes closed breathing even and unlabored. Pt with crying out with any stimulus. Family at bedside. Call light in reach. Will continue to monitor. 2116- Pt given PRN morphine 2mg. Pt getting bed bath premedicated for comfort. 2155- Post bath pt yelling out legs curled up unable to comfort. PRN geodon given at this time. 2210-PRN effective. Pt resting comfortably in bed with eyes closed; no signs of pain or distress noted. RR non labored. No agitation, NVD, or SOB. FLACC 0/10.    2333- Scheduled medication given at this time. PRN morphine given at this time    0012-PRN effective. Pt resting comfortably in bed with eyes closed; no signs of pain or distress noted. RR non labored. No agitation, NVD, or SOB. FLACC 0/10.    0326-Pt observed on rounds, resting with eyes closed. Respirations remain even and nonlabored. No facial grimacing noted. No moaning or agitation. Flacc 0/10. No additional symptoms to manage at this time. No acute changes noted. Comfort and Safety measures maintained. Alarm exit tab in place. Call light in reach. 0536-Scheduled Haldol, Ativan, and PRN Deodon given at this time. Pt is resting with eyes closed breathing even and unlabored. Pt is crying out without any stimulus. Call light in reach. Will continue to monitor.

## 2021-12-15 NOTE — PROGRESS NOTES
9429 Report received from off going RN. Pt was admitted on 12/7 [facility] level of care with a hospice dx of Sepsis and aspiration pneumonia with symptom management of pain, dyspnea and agitation. Pt is legally deaf. Pt required two doses of PRN Morphine 2mg and Two doses of Geodon 10mg for symptom management overnight. Pt is on room air. Currently the pt is resting in bed with her eyes closed, RR shallow and irregular at 8/min. Safety measures in place with bed low and locked, call bell in reach, side rails up x2. Door remains open for continued monitoring. Plan of care reviewed with CNA. D/C plan is for pt to remain at [facility] until her passing, ongoing assessment for appropriate level of care will continued. 0841 Pt ordered dose of Zyprexa brought to room but pt is not able to safely have anything put in her mouth at this time. There is much buildup of dry phlegm and secretions that required removing from the back of her throat. Pt's oral cavity is quite dry and lubricant used to loosen old build up and moisturize her oral cavity. Safety measures in place. Door remains open for continued monitoring. 0941 Pt is moaning and was medicated for symptom management with Haldol 5mg SQ and Morphine 2mg SQ. Safety measures remain in place. Door is open for continued monitoring. 1015 Pt is resting in bed with her eyes half open, mouth wide open. RR unlabored and irregular at 8/min with FLACC 0/10. Door remains open for continued monitoring. 200 Pt's DIL called for update and provided the privacy code and update given. 1202 Pt medicated with scheduled haldol 5mg SQ. Ativan 1mg held at this time as it is not safe to put any PO meds in pts mouth at this time. Pt is an aspiration risk. 1400 Pt repositioned onto her left side with pillow support. More mouth care done at this time. Safety measures in place. Door remains open for continued monitoring. Report given to oncoming RN.    0911 Pt medicated with Ativan 1mg for crying out and moaning, FLACC 4. Safety measures in place. Door remains open for continued monitoring. 36 Pt settled down with her eyes remaining open, Safety measures in place. Door remains open for continued monitoring. 1733 Scheduled dose of Geodon 10mg IM given along with Scheduled Haldol 2mg SQ. Mouth care done on patient and pt repositioned with pillow support. Pt is on an air mattress. Safety measures in place. Door remains open for continued monitoring. 1815 Pt is resting with her eyes half open, mouth open wide, oral cavity dry again. RR irregular at 7/min. Safety measures in place. Door remains open for continue monitoring. Report given to oncoming RN.

## 2021-12-16 NOTE — [facility] IDG SOCIAL WORKER NOTES
Patient: Kelly Silbey    Date: 12/16/21  Time: 11:10 AM    [facility]  Notes  Pt remains under GIP LOC. Discussions in IDG today indicate that pt will likely change to routine tomorrow. SW will talk with pt's family today about this possible change and next steps. No changes in the plan of care. SW will continue to provide ongoing emotional support and assessment of psychosocial and bereavement concerns, as well as needs until discharge.          Signed by: Dalton Hyde LMSW

## 2021-12-16 NOTE — PROGRESS NOTES
1845:  Patient report from Rober Hinkle RN. Patient ID by name and . Patient is GIP LOC for hospice diagnosis of metabolic encephalopathy to manage symptoms of pain, dyspnea, and agitation. Discharge plan is for patient to remain in [facility] until demise. Discharge plans to be evaluated for potential LOC change. RN reviewed POC with CNA. Patient resting with no s/sx of pain, dyspnea, or agitation observed. Bed low and locked and all safety measures in place. 2018:  New Fentanyl patch 12 mcg placed on left arm as ordered. Assessment complete. :  PRN Geodon IM given for patient restlessness and moaning in bed. Patient sideways in bed. Will reassess. :  Reassessment:  Patient resting quietly with no agitation noted. 0030:  Scheduled Haldol SQ administered as ordered. 0230:  Patient resting with no s/sx of pain, dyspnea, or agitation observed. FLACC 0/10.    6983:  Patient resting with no s/sx of pain, dyspnea, or agitation observed. FLACC 0/10.    0500:  Scheduled Geodon IM and Haldol SQ administered as ordered. Patient received one PRN dose of Geodon this shift for agitation. Report to Rober Hinkle RN.

## 2021-12-16 NOTE — PROGRESS NOTES
5437 Report received from off going RN. Pt was admitted on 12/7 [facility] level of care with hospice dx of metabolic encephalopathy. Pt required one dose of Geodon overnight for symptom management. Currently pt is resting in bed with her eyes half open, RR irregular at 8/min. Pt is on an air mattress and positioned with pillow support to protect bony prominences. Melo to gravity with dark yellow urine. Bed is low and locked, call bell in reach, tab alert in place, side rails up x2. Door remains open for continued monitoring. D/C plan is for pt to remain at [facility] until her passing, ongoing assessment will continued for appropriate level of care. 1920 Pt continues to rest without change in condition. Pt's eyes and mouth are wide open, FLACC 0/10. Safety measures in place. Door remains open for continued monitoring. 1030 Pt repositioned for comfort measures with pillow support between bony prominences. Pt's eyes and mouth are wide open. FLACC 0/10. Safety measures in place. Door remains open for continued monitoring. 1136 Scheduled dose of Haldol 5mg SQ given per orders. Pt is resting in bed with her eyes open, no change in condition. Safety measures in place. Door remains open for continued monitoring. 80 Pt continues to rest with her eyes open, mouth wide open. No change in condition. Safety measures in place. Door remains open for continued monitoring. 1440 Pt's daughter is at the bedside, very tearful in seeing the patient with her breathing shallow and eyes wide open. This RN explained to the daughter that the patient is comfortable and has shown no signs of pain or distress today that needed intervention. Pt has been breathing shallow since admittance. Daughter is comforted by update and sitting beside patient signing to her.  has been asked to go to the room. Safety measures in place. 1630 No change in patient's condition. Daughter is at the bedside, very tearful, support offered. Safety measures in place. 1729 Pt medicated with scheduled Geodon 10mg IM and Haldol 5mg SQ for symptom management. Daughter is at the bedside. Pt has FLACC 0/10. Safety measures in place. Report given to oncoming RN.

## 2021-12-16 NOTE — PROGRESS NOTES
Problem: Pressure Injury - Risk of  Goal: *Prevention of pressure injury  Description: Patient Mendel Sebastian Bigness will remain free from acquired pressure injuries AEB zero acquired pressure injuries during assessment of skin each shift during inpatient hospice stay.     Outcome: Progressing Towards Goal  Note: Pressure Injury Interventions:  Sensory Interventions: Assess changes in LOC,Float heels,Keep linens dry and wrinkle-free    Moisture Interventions: Absorbent underpads,Internal/External urinary devices,Limit adult briefs    Activity Interventions: Pressure redistribution bed/mattress(bed type)    Mobility Interventions: Float heels,Pressure redistribution bed/mattress (bed type)        Friction and Shear Interventions: Apply protective barrier, creams and emollients,HOB 30 degrees or less,Minimize layers

## 2021-12-16 NOTE — [facility] NOTES
Patient: Zhen Dalton    Date: 12/16/21  Time: 11:09 AM    [facility]  Notes  Intervention: Ministry of presence, prayer, family support. Outcome: During last visit patient was lying still with eyes and mouth open. There was a slight moan.  reported findings to nurse. She stated it was time for medication. She was preparing to see her. Family is not at bedside as often as during her initial admission. Plan: Continue to provide support throughout patient's time with Dilmatani Drake. GOAL: Mendel and her children, Melchor Torres will demonstrate appropriate anticipatory grief reactions related to Espinoza's impending death as evidenced by their ability to verbalize feelings associated with grief such as denial, bargaining, anger, and depression.  They will display feelings and associated behaviors in a healthy manner during inpatient hospice stay.             Signed by: Saravanan Lassiter

## 2021-12-16 NOTE — [facility] IDG MASTER NOTE
[facility] Collabor[facility]  Date: 12/16/21  Time: 12:59 PM    ___________________    Patient: Catalina Zamudio  Coverage Information:     Payor: [place]     Plan: [facility] MEDICAID [facility]     Subscriber ID: [id number]     Phone Number:   MRN: [id number]  Current Benefit Period: Benefit Period 1  Start Date: 12/7/2021  End Date: 3/6/2022        Hospice Attending Provider: Mendoza Kelsey [address] [place] [address]  [id number]  Phone: [phone]  Fax: [phone]    Level of Care: General Inpatient Care      ___________________    Diagnoses: There were no encounter diagnoses.     Current Medications:    Current Facility-Administered Medications:     LORazepam (ATIVAN) injection 1 mg, 1 mg, IntraMUSCular, Q4H PRN, Mendoza Kelsey MD, 1 mg at 12/15/21 1609    ziprasidone (GEODON) 10 mg in sterile water (preservative free) 0.5 mL injection, 10 mg, IntraMUSCular, Q12H, Mendoza Kelsey MD, 10 mg at 12/16/21 0500    fentaNYL (DURAGESIC) 12 mcg/hr patch 1 Patch, 1 Patch, TransDERmal, Q72H, Mendoza Kelsey MD, 1 Patch at 12/15/21 2018    haloperidol lactate (HALDOL) injection 5 mg, 5 mg, SubCUTAneous, Q6H, 5 mg at 12/16/21 1136 **OR** [DISCONTINUED] haloperidol (HALDOL) 2 mg/mL oral solution 5 mg, 5 mg, SubLINGual, Q8H, Nayeli Richardson NP    [Held by provider] glycopyrrolate (ROBINUL) injection 0.2 mg, 0.2 mg, SubCUTAneous, Q4H PRN, Gisela Waggoner NP    ziprasidone (GEODON) 10 mg in sterile water (preservative free) 0.5 mL injection, 10 mg, IntraMUSCular, Q6H PRN, Gisela Waggoner NP, 10 mg at 12/15/21 2115    haloperidol lactate (HALDOL) injection 5 mg, 5 mg, SubCUTAneous, Q2H PRN, 5 mg at 12/15/21 0940 **OR** haloperidol lactate (HALDOL) injection 5 mg, 5 mg, IntraVENous, Q2H PRN, Gisela Waggoner NP, 5 mg at 12/13/21 2046    acetaminophen (TYLENOL) suppository 650 mg, 650 mg, Rectal, Q3H PRN, Gisela Waggoner NP    bisacodyL (DULCOLAX) suppository 10 mg, 10 mg, Rectal, PRN, Norma Mcleod NP    morphine injection 2 mg, 2 mg, IntraVENous, Q20MIN PRN, 2 mg at 12/13/21 1124 **OR** morphine injection 2 mg, 2 mg, SubCUTAneous, Q20MIN PRN, Norma Mcleod NP, 2 mg at 12/15/21 0941    Orders:  Orders Placed This Encounter    IP CONSULT TO SPIRITUAL CARE Once on week one, then PRN. For [facility] patients only. For contracted patients, primary hospice will continue to manage spiritual care needs     Once on week one, then PRN. For [facility] patients only. For contracted patients, primary hospice will continue to manage spiritual care needs     Standing Status:   Standing     Number of Occurrences:   1     Order Specific Question:   Reason for Consult: Answer:   Spiritual crisis intervention or per patient or caregiver request    DIET PLEASURE     Please hold tray, will call if tray needed. Standing Status:   Standing     Number of Occurrences:   1    NURSING-MISCELLANEOUS: DME: Please order and place air mattress for pressure reduction. CONTINUOUS     Please order and place air mattress for pressure reduction. Standing Status:   Standing     Number of Occurrences:   1     Order Specific Question:   Description of Order:     Answer:   DME:    VITAL SIGNS     Standing Status:   Standing     Number of Occurrences:   1    VITAL SIGNS     Standing Status:   Standing     Number of Occurrences:   1    NURSING-MISCELLANEOUS: comfort measures Enter comfort measures above. CONTINUOUS     Enter comfort measures above. Standing Status:   Standing     Number of Occurrences:   1     Order Specific Question:   Description of Order:     Answer:   comfort measures    PRAJAPATI CATHETER, CARE     1. Prajapati Catheter care every shift and PRN  2. Notify Physician of Prajapati Catheter leakage, occlusion, gross adherent sediment or accidental removal  3. Change Prajapati 30 days after insertion.   4. May flush catheter prn leakage or gross adherent sediment or mucus. Standing Status:   Standing     Number of Occurrences:   1    BLADDER CHECKS     May scan bladder PRN for urinary retention and or patient discomfort     Standing Status:   Standing     Number of Occurrences:   1    NURSING ASSESSMENT:  SPECIFY Assess for GIP, routine, or respite level of care. Q SHIFT Routine     Standing Status:   Standing     Number of Occurrences:   1     Order Specific Question:   Please describe the test or procedure you would like to order. Answer:   Assess for GIP, routine, or respite level of care.  PAIN ASSESSMENT Pain and Symptoms: Assess ever 4 hours and PRN, for GIP level of care. PRN Routine     Standing Status:   Standing     Number of Occurrences:   1     Order Specific Question:   Please describe the test or procedure you would like to order. Answer:   Pain and Symptoms: Assess ever 4 hours and PRN, for GIP level of care.  NURSING-MISCELLANEOUS: Peripheral IV access: 1. May use peripheral IV if present. If no IV access present, may place peripheral IV for medication administration. 2. Maintain IV access per hospital policy. CONTINUOUS     1. May use peripheral IV if present. If no IV access present, may place peripheral IV for medication administration. 2. Maintain IV access per hospital policy. Standing Status:   Standing     Number of Occurrences:   1     Order Specific Question:   Description of Order:     Answer:   Peripheral IV access:    NURSING-MISCELLANEOUS: admit 12/7: (Jocy) Admitted GIP with metabolic encephalopathy for management of pain, dyspnea, agitation. Associated Dx:   Aspiration PNA RLL, dysphagia, AMS, fever, sepsis, hypoxic resp. failure, leukocytosis, hypernatrem. ..     12/7: (Jocy) Admitted GIP with metabolic encephalopathy for management of pain, dyspnea, agitation.     Associated Dx:   Aspiration PNA RLL, dysphagia, AMS, fever, sepsis, hypoxic resp. failure, leukocytosis, hypernatremia, hypoalbuminemia, strict NPO due to failed swallow evaluation, debility, severe protein calorie malnutrition, dementia with behavior disturbance, and deafness. Non Associated Dx: Bipolar disorder, depression, HTN, HLD, and hypothyroidism. Mendel Bella Ouch is a 62 y.o. female who has a hospice diagnosis of metabolic encephalopathy. Hospice associated diagnoses are aspiration pneumonia (RLL), dysphagia, AMS, fever, sepsis, hypoxic respiratory failure, leukocytosis, hypernatremia, hypoalbuminemia, strict NPO due to failed swallow evaluation, debility, severe protein calorie malnutrition, cachexia, dementia with behavior disturbance, bipolar disorder, schizophrenia and deafness. Non-associated diagnoses are depression, HTN, HLD, and hypothyroidism. She presented from [address] to the [facility] ER on 12/3 with fever. The family revoked hospice care at the facility for evaluation and treatment of fever and hypoxia at the ER. She was tachycardic and febrile on arrival. She was also hypotensive with BP 84/55. Lab studies revealed leukocytosis with WBC 18.1, hypernatremia with sodium 149 and elevated procalcitonin at 0.31. Chest xray revealed right basilar opacity indicating aspiration pneumonia. Covid-19/Flu A&B/RSV were negative. CT of the head was negative. She was started on IV fluids and IV antibiotics with Cefepime and Zosyn. She was treated per sepsis protocol. Aspiration pneumonia was thought to be caused by chronic dysphagia from dementia. Swallowing study was completed and she was made strict NPO due to aspiration of all consistencies. She has severe protein calorie malnutrition with BMI 13 with severe cachexia. After 4 days of treatment with no improvement in her encephalopathic state, her family opted to stop aggressive measures. Her family did not want a NG or permanent feeding tube placed. Sepsis has resolved but aspiration pneumonia is likely to return especially with comfort feeding.  Due to her continued decline, her family has elected to forgo further medical treatment and pursue comfort measures with hospice care. Without further treatment, her life expectancy is less than 10 days. She is unable to take in food, fluids or medications orally and will require parenteral medications for symptom management. Patient admitted GIP with metabolic encephalopathy for management of pain, dyspnea, agitation. Standing Status:   Standing     Number of Occurrences:   1     Order Specific Question:   Description of Order:     Answer:   admit    DO NOT RESUSCITATE     Standing Status:   Standing     Number of Occurrences:   1    OXYGEN CANNULA Liters per minute: 2; Indications for O2 therapy: RESPIRATORY DISTRESS PRN Routine     Standing Status:   Standing     Number of Occurrences:   1     Order Specific Question:   Liters per minute:      Answer:   2     Order Specific Question:   Indications for O2 therapy     Answer:   RESPIRATORY DISTRESS    DISCONTD: sodium chloride (NS) flush 3 mL    DISCONTD: sodium chloride (NS) flush 3 mL    DISCONTD: haloperidol lactate (HALDOL) injection 2 mg    DISCONTD: haloperidol lactate (HALDOL) injection 2 mg    acetaminophen (TYLENOL) suppository 650 mg    bisacodyL (DULCOLAX) suppository 10 mg    DISCONTD: haloperidol lactate (HALDOL) injection 2 mg    DISCONTD: haloperidol lactate (HALDOL) injection 2 mg    DISCONTD: glycopyrrolate (ROBINUL) injection 0.2 mg    OR Linked Order Group     morphine injection 2 mg     morphine injection 2 mg    DISCONTD: ziprasidone (GEODON) 5 mg in sterile water (preservative free) 0.25 mL injection    DISCONTD: LORazepam (ATIVAN) injection 0.5 mg    haloperidol lactate (HALDOL) injection 2 mg    DISCONTD: haloperidol lactate (HALDOL) injection 4 mg    DISCONTD: haloperidol lactate (HALDOL) injection 4 mg    DISCONTD: haloperidoL (HALDOL) tablet 5 mg    DISCONTD: haloperidol (HALDOL) 2 mg/mL oral solution 5 mg    DISCONTD: haloperidol lactate (HALDOL) injection 4 mg    DISCONTD: haloperidol (HALDOL) 2 mg/mL oral solution 5 mg    DISCONTD: haloperidol lactate (HALDOL) injection 4 mg    DISCONTD: haloperidol lactate (HALDOL) injection 4 mg    benztropine (COGENTIN) 2 mg tablet     Sig: Take 1 mg by mouth nightly.  clonazePAM (KlonoPIN) 0.5 mg tablet     Sig: Take 0.5 mg by mouth three (3) times daily.  mirtazapine (Remeron) 15 mg tablet     Sig: Take 15 mg by mouth nightly.  QUEtiapine (SEROqueL) 50 mg tablet     Sig: Take 150 mg by mouth two (2) times a day.  senna (Senna) 8.6 mg tablet     Sig: Take 2 Tablets by mouth nightly.  acetaminophen (TylenoL) 325 mg tablet     Sig: Take 650 mg by mouth every six (6) hours as needed for Pain.  guaiFENesin (ROBITUSSIN) 100 mg/5 mL liquid     Sig: Take 200 mg by mouth every four (4) hours as needed for Cough.  melatonin 5 mg tablet     Sig: Take 5 mg by mouth nightly as needed (sleep).  ondansetron hcl (ZOFRAN) 4 mg tablet     Sig: Take 4 mg by mouth every six (6) hours as needed for Nausea or Vomiting.  polyethylene glycol (Miralax) 17 gram packet     Sig: Take 17 g by mouth daily as needed for Constipation.     DISCONTD: LORazepam (ATIVAN) injection 1 mg    DISCONTD: sodium chloride (NS) flush 3 mL    DISCONTD: LORazepam (ATIVAN) injection 1 mg    DISCONTD: haloperidol lactate (HALDOL) injection 5 mg    ziprasidone (GEODON) 10 mg in sterile water (preservative free) 0.5 mL injection    OR Linked Order Group     haloperidol lactate (HALDOL) injection 5 mg     haloperidol lactate (HALDOL) injection 5 mg    DISCONTD: LORazepam (ATIVAN) tablet 1 mg    haloperidol lactate (HALDOL) injection 5 mg    glycopyrrolate (ROBINUL) injection 0.2 mg    DISCONTD: OLANZapine (ZyPREXA zydis) disintegrating tablet 5 mg    DISCONTD: OLANZapine (ZyPREXA zydis) disintegrating tablet 5 mg    OLANZapine (ZyPREXA zydis) disintegrating tablet 10 mg    DISCONTD: ziprasidone (GEODON) 10 mg in sterile water (preservative free) 0.5 mL injection    LORazepam (ATIVAN) injection 1 mg    ziprasidone (GEODON) 10 mg in sterile water (preservative free) 0.5 mL injection    fentaNYL (DURAGESIC) 12 mcg/hr patch 1 Patch    INITIAL PHYSICIAN ORDER: HOSPICE Level Of Care: General Inpatient; Reason for Admission: 12/7: (Jocy) Admitted GIP with metabolic encephalopathy for management of pain, dyspnea, agitation and wound care. Standing Status:   Standing     Number of Occurrences:   1     Order Specific Question:   Status     Answer:   Hospice     Order Specific Question:   Level Of Care     Answer:   General Inpatient     Order Specific Question:   Reason for Admission     Answer:   12/7: (Jocy) Admitted GIP with metabolic encephalopathy for management of pain, dyspnea, agitation and wound care. Order Specific Question:   Inpatient Hospitalization Certified Necessary for the Following Reasons     Answer:   3. Patient receiving treatment that can only be provided in an inpatient setting (further clarification in H&P documentation)     Order Specific Question:   Admitting Diagnosis     Answer:   Metabolic encephalopathy [832.48. ICD-9-CM]     Order Specific Question:   Terminal Prognosis Diagnosis(es)     Answer:   Metabolic encephalopathy [385.19. ICD-9-CM]     Order Specific Question:   Admitting Physician     Answer:   Sara Milan [[id number]]     Order Specific Question:   Attending Physician     Answer:   Adiel Whitmore     Order Specific Question:   Discharge Plan:     Answer: Other (Specify)    IP CONSULT TO SOCIAL WORK     Once on week one, then PRN for Psychosocial crisis intervention or per patient or caregiver request.  For [facility] patients only. For contracted patients, primary hospice will continue to manage social work needs. Standing Status:   Standing     Number of Occurrences:   1     Order Specific Question:   Reason for Consult: Answer:    Once on week one, then PRN for Psychosocial crisis intervention or per patient or caregiver request.       Allergies:  No Known Allergies    Care Plan:  Encounter Problems (Active)     Problem: Alteration in Mobility     Dates: Start: 21       Disciplines: Interdisciplinary    Goal: Remain as independent as possible and remain safe in environment     Dates: Start: 21   Expected End: 21       Description: Patient will remain as independent as possible and remain safe in their environment. Disciplines: Interdisciplinary    Intervention: Assess for deficits in mobility     Dates: Start: 21          Intervention: Instruct on mobility methods     Dates: Start: 21       Description: Instruct patient/caregiver on mobility methods. Problem: Anticipatory Grief     Dates: Start: 21       Disciplines: Interdisciplinary    Goal: Explore reactions to and verbalize acceptance of impending loss     Dates: Start: 21       Description: Patient/family/caregiver will explore reactions to and verbalize acceptance of impending loss. Disciplines: Interdisciplinary    Intervention: Assess grief responses     Dates: Start: 21          Intervention: Assist with grief counseling     Dates: Start: 21       Description:  to assist.       Intervention: Jacinto Apgar on stages of grief     Dates: Start: 21       Description: Instruct patient/caregiver on stages of grief. Intervention: Offer grief support group     Dates: Start: 21       Description: Patient/family/caregiver will explore reactions to and verbalize acceptance of impending loss.              Problem: Anticipatory Grief     Dates: Start: 21       Description: GOAL: Mendel and her children, Violette Zhao will demonstrate appropriate anticipatory grief reactions related to Espinoza's impending death as evidenced by their ability to verbalize feelings associated with grief such as denial, bargaining, anger, and depression. They will display feelings and associated behaviors in a healthy manner during inpatient hospice stay. Disciplines: Interdisciplinary    Goal: Grief heard and acknowledged, anxiety reduced, patient coping identified, patient/family expressed gratitude     Dates: Start: 12/09/21   Expected End: 12/17/21       Description: GOAL:  Mendel and her childrenTing will demonstrate appropriate anticipatory grief reactions related to Espinoza's impending death as evidenced by   their ability to verbalize feelings associated with grief such as denial, bargaining, anger, and depression. They will display feelings and associated behaviors in a healthy manner during inpatient hospice stay. Disciplines: Interdisciplinary    Intervention: Assess grief responses     Dates: Start: 12/09/21       Description: [address] will assess grief reactions with each visit. Intervention: Support grieving process     Dates: Start: 12/09/21       Description: [address] will provide opportunities for meaningful, nonjudgmental conversations and education. Problem: Anxiety/Agitation     Dates: Start: 12/07/21       Disciplines: Interdisciplinary    Goal: Verbalize or staff assess the ability to manage anxiety     Dates: Start: 12/07/21   Expected End: 12/18/21       Description: Patient Mendel Pola Cordial will demonstrate appropriate motor behavior AEB less than 2 episodes of fidgety, picking or pulling at clothes on devices, yelling out, getting out of bed, etc. each shift during inpatient hospice stay. Disciplines: Interdisciplinary    Intervention: Assess for anxiety/agitation     Dates: Start: 12/07/21       Description: Assess for signs and symptoms of anxiety and agitation. Intervention: Instruct/Implement strategies to reduce anxiety/agitation     Dates: Start: 12/07/21       Description: Instruct patient/caregiver on strategies to reduce anxiety/agitation.              Problem: Breathing Pattern - Ineffective     Disciplines: Interdisciplinary    Goal: *Use of effective breathing techniques     Dates: Start: 12/07/21   Expected End: 12/14/21       Disciplines: Interdisciplinary    Intervention: Patent airway maintenance     Dates: Start: 12/07/21          Intervention: Head of bed elevation     Dates: Start: 12/07/21          Intervention: Breathing pattern signs and symptoms assessment (eg: Apnea; bradypnea; pursed-lip; dyspnea; hyperpnea; paradoxical; periodic; hyperventilation; hypoventilation; tachypnea)     Dates: Start: 12/07/21          Intervention: Anxiety assessment (eg: Attention span; behavior manifestation; coping mechanism;  self-perception; sleep pattern)     Dates: Start: 12/07/21          Intervention: Monitor for change in patient condition (eg: Vital signs; hypoxemia; mental status; level of consciousness; skin temperature, color)     Dates: Start: 12/07/21          Intervention: Respiratory management (eg: Oxygen therapy; suctioning)     Dates: Start: 12/07/21                Problem: Communication Deficit     Dates: Start: 12/07/21       Disciplines: Interdisciplinary    Goal: Effectively communicate symptoms, needs, and concerns     Dates: Start: 12/07/21   Expected End: 12/14/21       Description: Patient/family/caregiver will effectively communicate symptoms, needs and concerns. Disciplines: Interdisciplinary    Intervention: Assess for deficit in communication     Dates: Start: 12/07/21          Intervention: Instruct/Implement strategies to effectively communicate     Dates: Start: 12/07/21       Description: Instruct patient/caregiver on strategies to effectively communicate.              Problem: Coping and Emotional Distress     Dates: Start: 12/07/21       Disciplines: Interdisciplinary    Goal: Demonstrate acceptance of terminal illness and understanding of disease progression     Dates: Start: 12/07/21       Description: Patient/family/caregiver will demonstrate acceptance of terminal disease and understanding of disease progression while employing appropriate coping mechanisms. Disciplines: Interdisciplinary    Intervention: Assess for alteration in coping     Dates: Start: 12/07/21          Intervention: Assess for signs/symptoms of emotional distress     Dates: Start: 12/07/21          Intervention: Instruct on effective coping skills     Dates: Start: 12/07/21       Description: Instruct patient/caregiver on effective coping skills. Intervention: Instruct on strategies to reduce emotional distress     Dates: Start: 12/07/21       Description: Instruct patient/caregiver on strategies to reduce emotional distress. Problem: Discharge Planning     Dates: Start: 12/07/21       Disciplines: Interdisciplinary    Goal: *Participates in discharge planning     Dates: Start: 12/07/21   Expected End: 12/14/21       Disciplines: Interdisciplinary    Intervention: Healthcare knowledge assessment to include dying process, prognosis, treatment regimen, medications upon discharge     Dates: Start: 12/07/21          Intervention: Advanced care planning     Dates: Start: 12/07/21          Intervention: Identify support systems     Dates: Start: 12/07/21                Problem: Dyspnea Due to End of Life     Dates: Start: 12/07/21       Disciplines: Interdisciplinary    Goal: Demonstrate understanding of and ability to manage respiratory symptoms at end of life     Dates: Start: 12/07/21   Expected End: 12/17/21       Description: Patient Mendel Bakari Tiago will indicate effective breathing pattern AEB absence of respiratory distress each shift during inpatient hospice stay.        Disciplines: Interdisciplinary    Intervention: Assess for signs and symptoms of dyspnea     Dates: Start: 12/07/21          Intervention: Instruct on causes/symptoms of dyspnea     Dates: Start: 12/07/21          Intervention: Instruct patient/caregiver/family on strategies to effectively manage dyspnea     Dates: Start: 12/07/21                Problem: Emotional Support Needs     Dates: Start: 12/09/21       Description: Pt, pt's daughter Arsenio Luis, and pt's family will have their emotional support needs met weekly by SW. Disciplines: Interdisciplinary    Goal: Patient/family is receiving emotional support     Dates: Start: 12/09/21   Expected End: 12/17/21       Description: Pt, Caitlyn, and family will receive emotional support from SW weekly through weekly check-ins, education on the hospice philosophy, validation of feelings, rapport building, the use of a  if needed, and active listening throughout pt's hospice journey. Disciplines: Interdisciplinary    Intervention: Assess for emotional distress     Dates: Start: 12/09/21       Description: SW will assess for emotional distress in pt using visual charts, or a , and observation and assessment of nonverbal cues, and distress in Caitlyn and pt's family through the use of open ended questions, observation of verbal/nonverbal cues, and motivational interviewing techniques. Intervention: Provide emotional support of the family's cultural expressions of grief and loss     Dates: Start: 12/09/21       Description: SW will provide emotional support of the family's cultural expression of grief, loss, and response to illness. Problem: End of Life Process     Dates: Start: 12/07/21       Disciplines: Interdisciplinary    Goal: Demonstrate understanding of end of life processes     Dates: Start: 12/07/21   Expected End: 12/14/21       Description: Alex Meyer will understand end of life processes.     Disciplines: Interdisciplinary    Intervention: Assess for signs/symptoms of terminal restlessness     Dates: Start: 12/07/21          Intervention: Implement strategies to reduce terminal restlessness     Dates: Start: 12/07/21          Intervention: Instruct on the dying process     Dates: Start: 21          Intervention: Instruct: imminent death     Dates: Start: 21          Intervention: Instruct: process at end of life     Dates: Start: 21                Problem: Falls - Risk of     Dates: Start: 21       Disciplines: Interdisciplinary    Goal: *Absence of Falls     Dates: Start: 21   Expected End: 21       Description: Patient Mendel Tereasa Motto will remain free from falls AEB no injuries r/t falls each shift during inpatient hospice stay.       Disciplines: Interdisciplinary          Problem: Grieving     Disciplines: Interdisciplinary    Goal: *Able to express feelings of grief     Dates: Start: 21       Disciplines: Interdisciplinary    Intervention: Complicated grief signs assessment     Dates: Start: 21          Intervention: Complicated grief risk assessment     Dates: Start: 21          Intervention: Assess family practices, cultural, spiritual, and Christian healthcare practices and language     Dates: Start: 21             Goal: *Able to identify stages of grieving process     Dates: Start: 21       Disciplines: Interdisciplinary    Intervention: Death ritual assessment     Dates: Start: 21          Intervention: Grieving process stage assessment     Dates: Start: 21          Intervention: Bereavement plan initiation     Dates: Start: 21          Intervention: Provide bereavement information, maintain a proactive family-focused approach, use structured format and allow for time for the family to talk     Dates: Start: 21          Intervention: Provide information on availability of spiritual care services, and culturally sensitive mourning rituals     Dates: Start: 21          Intervention: Assist patient and family to decide about autopsy,  plans, completing important life tasks, coping with impending death, engaging in meaningful rituals, providing care of the body after death     Dates: Start: 21                Problem: Imminent Death     Dates: Start: 21       Disciplines: Interdisciplinary    Goal: Collaborate with patient/family/caregiver/interdisciplinary team to minimize and manage end of life symptoms     Dates: Start: 21       Disciplines: Interdisciplinary    Intervention: Instruct on end of life issues     Dates: Start: 21       Description: Instruct patient/caregiver on end of life issues. Intervention: Instruct on impending death     Dates: Start: 21       Description: Instruct on impending death, need for  arrangements, physical care, patient wishes, hospice role, and signs/symptoms of approaching death. Intervention: Instruct on what to do at death     Dates: Start: 21       Description: Instruct caregiver on what to do at time of death. Intervention: Plan for death     Dates: Start: 21       Description: Assess patient/caregiver emotional readiness and plan for death and assist as needed. Problem: Infection - Risk of, Urinary Catheter-Associated Urinary Tract Infection     Dates: Start: 21       Disciplines: Interdisciplinary    Goal: *Absence of infection signs and symptoms     Dates: Start: 21   Expected End: 21       Description: Patient Mendel Carie Hidalgo will remain free from infection R/T urinary catheter AEB absence of signs and symptoms of infection each shift during inpatient hospice stay. Disciplines: Interdisciplinary    Intervention: Urinary catheter needs assessment     Dates: Start: 21       Description: Medically/surgically unstable; Chemically paralyzed; Obstruction/retention; Strict I/Os;  Surgical procedure; Comfort Care; Multiple Stage 3 or 4 pressure ulcers, chest to knees       Intervention: Urine assessment (eg: Clarity; color; odor; volume)     Dates: Start: 21          Intervention: Infection signs and symptoms monitoring - urinary tract (eg: Flank or suprapubic pain; burning; fever; dysuria; frequency; urgency; cloudy/bloody/foul odor urine; confusion/agitation; incontinence)     Dates: Start: 12/07/21          Intervention: Lab monitoring (eg: Urinalysis; culture and sensitivity; complete blood count with differential)     Dates: Start: 12/07/21          Intervention: Urinary catheter management (eg: Closed urinary catheter system maintenance; urinary catheter patency with free downhill flow; perineal care; monitor intake and output)     Dates: Start: 12/07/21          Intervention: Urinary catheter discontinuation     Dates: Start: 12/07/21                Problem: Nausea/Vomiting (Adult)     Dates: Start: 12/07/21       Disciplines: Interdisciplinary    Goal: *Absence of nausea/vomiting     Dates: Start: 12/07/21   Expected End: 12/15/21       Disciplines: Interdisciplinary    Intervention: Aspiration risk - signs and symptoms (eg: Ineffective cough; altered level of consciousness; impaired mobility; drooling; poor dentition; vomiting; slurred speech; prolonged supine)     Dates: Start: 12/07/21          Intervention: Nonpharmacologic nausea management (eg:  Consistent room temperature; deep breathing; distraction; ice chips; minimal moving; pain management)     Dates: Start: 12/07/21          Intervention: Administer antiemetics as needed     Dates: Start: 12/07/21                Problem: Pain     Dates: Start: 12/07/21       Disciplines: Interdisciplinary    Goal: Assess satisfaction of level of comfort and symptom control     Dates: Start: 12/07/21   Expected End: 12/18/21       Description: Patient Mendel Roetta Brine will exhibit decrease in pain AEB rating pain less than 3 on 1-10 scale or 3 FLACC score within each shift of receiving pain medication during inpatient hospice stay.      Disciplines: Interdisciplinary    Intervention: Assess effectiveness of pain management     Dates: Start: 12/07/21          Intervention: Assess for signs/symptoms of acute pain     Dates: Start: 12/07/21          Intervention: Assess for signs/symptoms of chronic pain     Dates: Start: 12/07/21          Intervention: Implement non-pharmacological pain management     Dates: Start: 12/07/21          Intervention: Instruct on pain scales     Dates: Start: 12/07/21          Intervention: Implement pharmacological pain management     Dates: Start: 12/07/21             Goal: *Control of acute pain     Dates: Start: 12/07/21   Expected End: 12/14/21       Disciplines: Interdisciplinary    Intervention: Assess pain characteristics (eg: Intensity scale; onset; location; quality; severity; duration; frequency; radiation)     Dates: Start: 12/07/21          Intervention: Assess pain management - barriers (eg: Past pain experiences)     Dates: Start: 12/07/21          Intervention: Identify pain expectations (eg: Patient's pain goal; somatic experiences; behavioral changes; affect)     Dates: Start: 12/07/21          Intervention: Identify pain medication concerns (eg: Cultural considerations; addiction concerns)     Dates: Start: 12/07/21          Intervention: Support system identification (eg: Caregiver; community resource; family; friends; Anabaptist; support group)     Dates: Start: 12/07/21          Intervention: Monitor for change in patient condition (eg:  Vital signs changes; changes in level of consciousness; nausea; behavioral changes)     Dates: Start: 12/07/21          Intervention: Medication side-effect assessment     Dates: Start: 12/07/21          Intervention: Pain-relief response reassessment (eg: Frequency based on route of administration; effectiveness)     Dates: Start: 12/07/21                Problem: Patient Education: Go to Patient Education Activity     Dates: Start: 12/08/21       Disciplines: Interdisciplinary    Goal: Patient/Family Education     Dates: Start: 12/08/21       Disciplines: Interdisciplinary          Problem: Pressure Injury - Risk of     Dates: Start: 12/07/21       Description: Patient Mendel Clydia Colonel will remain free from acquired pressure injuries AEB zero acquired pressure injuries during assessment of skin each shift during inpatient hospice stay. Disciplines: Interdisciplinary    Goal: *Prevention of pressure injury     Dates: Start: 12/07/21   Expected End: 12/21/21       Description: Patient Mendel Clydia Colonel will remain free from acquired pressure injuries AEB zero acquired pressure injuries during assessment of skin each shift during inpatient hospice stay. Disciplines: Interdisciplinary          Problem: Risk for Falls     Dates: Start: 12/07/21       Disciplines: Interdisciplinary    Goal: Free of falls during inpatient stay     Dates: Start: 12/07/21   Expected End: 12/14/21       Description: Patient will be free of falls during inpatient stay. Disciplines: Interdisciplinary    Intervention: Assess fall risk     Dates: Start: 12/07/21       Description: Complete fall risk assessment on admission, recertification, and as indicated on fall. Intervention: Instruct on fall prevention     Dates: Start: 12/07/21       Description: Call for assistance with ambulation and transfers during inpatient stay             Problem: Spiritual Distress     Dates: Start: 12/07/21       Disciplines: Interdisciplinary    Goal: Distress heard, acknowledged, and addressed     Dates: Start: 12/07/21       Description: Patient/family/caregiver distress will be heard, acknowledged, and addressed throughout hospice care.     Disciplines: Interdisciplinary    Intervention: Assess for signs/symptoms of spiritual distress     Dates: Start: 12/07/21          Intervention: Consult on spiritual care     Dates: Start: 12/07/21       Description: Consult to Spiritual Care       Intervention: Discuss strategies to reduce spiritual distress     Dates: Start: 12/07/21       Description: Discuss with patient/caregiver strategies to reduce spiritual distress.             Care Plan Problems/Goals  Report    3 of 26 Goals Resolved/Met 3 of 26 Resolved/Met     Progressing Towards Goal (23)      *Prevention of pressure injury (Pressure Injury - Risk of)    Disciplines:  Interdisciplinary Expected end:  12/21/21        Outcome: Progressing Towards Goal By Heather Love RN on 12/16/21 0245            Free of falls during inpatient stay (Risk for Falls)    Disciplines:  Interdisciplinary Expected end:  12/14/21         Outcome: Progressing Towards Goal By Suzanne Littlejohn on 12/14/21 1925            Remain as independent as possible and remain safe in environment (Alteration in Mobility)    Disciplines:  Interdisciplinary Expected end:  12/14/21         Outcome: Progressing Towards Goal By Suzanne Littlejohn on 12/14/21 1925            Assess satisfaction of level of comfort and symptom control (Pain)    Disciplines:  Interdisciplinary Expected end:  12/18/21        Outcome: Progressing Towards Goal By Heather Love RN on 12/16/21 0245            *Control of acute pain (Pain)    Disciplines:  Interdisciplinary Expected end:  12/14/21         Outcome: Progressing Towards Goal By Suzanne Littlejohn on 12/14/21 1925            Explore reactions to and verbalize acceptance of impending loss (Anticipatory Grief)    Disciplines:  Interdisciplinary Expected end:  -        Outcome: Progressing Towards Goal By Suzanne Littlejohn on 12/14/21 1925            Verbalize or staff assess the ability to manage anxiety (Anxiety/Agitation)    Disciplines:  Interdisciplinary Expected end:  12/18/21        Outcome: Progressing Towards Goal By Heather Love RN on 12/16/21 0245            Effectively communicate symptoms, needs, and concerns (Communication Deficit)    Disciplines:  Interdisciplinary Expected end:  12/14/21         Outcome: Progressing Towards Goal By Suzanne Littlejohn on 12/14/21 1925            Demonstrate acceptance of terminal illness and understanding of disease progression (Coping and Emotional Distress)    Disciplines:  Interdisciplinary Expected end:  -        Outcome: Progressing Towards Goal By Judithe Parker Dam on 12/14/21 1925            Distress heard, acknowledged, and addressed (Spiritual Distress)    Disciplines:  Interdisciplinary Expected end:  -        Outcome: Progressing Towards Goal By Judithe Parker Dam on 12/14/21 1925            *Use of effective breathing techniques (Breathing Pattern - Ineffective)    Disciplines:  Interdisciplinary Expected end:  12/14/21         Outcome: Progressing Towards Goal By Judithe Parker Dam on 12/14/21 1925            *Able to express feelings of grief (Grieving)    Disciplines:  Interdisciplinary Expected end:  -        Outcome: Progressing Towards Goal By Judithe Parker Dam on 12/14/21 1925            *Able to identify stages of grieving process (Grieving)    Disciplines:  Interdisciplinary Expected end:  -        Outcome: Progressing Towards Goal By Judithe Parker Dam on 12/14/21 1925            *Absence of nausea/vomiting (Nausea/Vomiting (Adult))    Disciplines:  Interdisciplinary Expected end:  12/15/21         Outcome: Progressing Towards Goal By Judithe Parker Dam on 12/14/21 1925            *Absence of infection signs and symptoms (Infection - Risk of, Urinary Catheter-Associated Urinary Tract Infection)    Disciplines:  Interdisciplinary Expected end:  12/21/21        Outcome: Progressing Towards Goal By Nia Castro RN on 12/16/21 3295            Demonstrate understanding of end of life processes (End of Life Process)    Disciplines:  Interdisciplinary Expected end:  12/14/21         Outcome: Progressing Towards Goal By Judithe Albert on 12/14/21 1925            Demonstrate understanding of and ability to manage respiratory symptoms at end of life (Dyspnea Due to End of Life)    Disciplines:  Interdisciplinary Expected end:  12/17/21        Outcome: Progressing Towards Goal By Nia Castro RN on 12/16/21 9075            Collaborate with patient/family/caregiver/interdisciplinary team to minimize and manage end of life symptoms (Imminent Death)    Disciplines:  Interdisciplinary Expected end:  -        Outcome: Progressing Towards Goal By [facility] on 12/14/21 1925            *Participates in discharge planning (Discharge Planning)    Disciplines:  Interdisciplinary Expected end:  12/14/21         Outcome: Progressing Towards Goal By Pedro Dickerson on 12/11/21 0553            *Absence of Falls (Falls - Risk of)    Disciplines:  Interdisciplinary Expected end:  12/17/21        Outcome: Progressing Towards Goal By Messi Lovett RN on 12/16/21 0245            Patient/Family Education (Patient Education: Go to Patient Education Activity)    Disciplines:  Interdisciplinary Expected end:  -        Outcome: Progressing Towards Goal By [facility] on 12/14/21 1925            Patient/family is receiving emotional support (Emotional Support Needs)    Disciplines:  Interdisciplinary Expected end:  12/17/21        Outcome: Progressing Towards Goal By Pedro Dickerson on 12/11/21 0553            Grief heard and acknowledged, anxiety reduced, patient coping identified, patient/family expressed gratitude (Anticipatory Grief)    Disciplines:  Interdisciplinary Expected end:  12/17/21        Outcome: Progressing Towards Goal By Pedro Dickerson on 12/11/21 0553                         Resolved/Met (3)      Patient/Family Education (Patient Education: Go to Patient Education Activity)    Disciplines:  Interdisciplinary Expected end:  -        Outcome: Resolved/Met By Stepan Curran RN on 12/12/21 1826            Demonstrate understanding of hospice philosophy, plan of care, and home hospice program (Hospice Orientation)    Disciplines:  Interdisciplinary Expected end:  12/14/21        Outcome: Resolved/Met By Stepan Curran RN on 12/12/21 1826            *Absence of infection signs and symptoms (Infection - Risk of, Central Venous Catheter-Associated Bloodstream Infection)    Disciplines:  Interdisciplinary Expected end:  12/14/21        Outcome: Resolved/Met By Yolanda Campbell RN on 12/13/21 0556                              ___________________    Care Team Notes          POC/IDG Notes      [facility] IDG  Notes by Leandro Dominguez LMSW at 12/16/21 1110  Version 2 of 2    Author: Leandro Dominguez LMSW Service: -- Author Type: Licensed Masters in Social Work    Filed: 12/16/21 1124 Date of Service: 12/16/21 1110 Status: Addendum    : Leandro [place], [address] (Licensed Masters in Social Work)    Related Notes: Original Note by Leandro [place], [address] (Licensed Masters in Social Work) filed at 12/16/21 1114       Patient: Mendel Roniurka Parks    Date: 12/16/21  Time: 11:10 AM    [facility]  Notes  Pt remains under GIP LOC. Discussions in ID today indicate that pt will likely change to routine tomorrow. SW will talk with pt's family today about this possible change and next steps. No changes in the plan of care. SW will continue to provide ongoing emotional support and assessment of psychosocial and bereavement concerns, as well as needs until discharge. Signed by: Dalton Hyde LMSW       [facility] IDG  Notes by Leandro Dominguez LMSW at 12/16/21 1110  Version 1 of 2    Author: Leandro Dominguez LMSW Service: -- Author Type: Licensed Masters in Social Work    Filed: 12/16/21 1114 Date of Service: 12/16/21 1110 Status: Signed    : Leandro [place], [address] (Licensed Masters in Social Work)    Related Notes: Addendum by Leandro [place], [address] (Licensed Masters in Social Work) filed at 12/16/21 1124       Patient: Mendel Roetta Charly    Date: 12/16/21  Time: 11:10 AM    [facility]  Notes  Pt remains under GIP LOC. No changes in the plan of care. SW will continue to provide ongoing emotional support and assessment of psychosocial and bereavement concerns, as well as needs until discharge.          Signed by: Mohan Rausch [facility] IDG Nurse Notes by Zackary Rojo RN at 12/16/21 1112  Version 1 of 1    Author: Zackary Rojo RN Service: NURSING Author Type: Registered Nurse    Filed: 12/16/21 1118 Date of Service: 12/16/21 1112 Status: Signed    : Zackary Rojo RN (Registered Nurse)         Patient: Miah Vergara    Date: 12/16/21  Time: 11:12 AM    [facility] Nurse Notes  Follow Up  IDG: Pt is a 62y.o. year-old female with Metabolic Encephalopathy who is here GIP level of care for management of pain and agitation. Melo with UOP of 450ml   IV access: N/A   PO intake:NPO  Oxygen: Room air  Wounds:  None  PRN medications: Haldol 5mg IV x 1 dose for agitaiton / Ativan 1mg IV x 1 dose for agitation / Morphine 2mg x 1 dose IV for pain / Geodon 10mg IV x 1 dose for agitation. Scheduled meds:    Current Facility-Administered Medications   Medication Dose Route Frequency    ziprasidone (GEODON) 10 mg in sterile water (preservative free) 0.5 mL injection  10 mg IntraMUSCular Q12H    fentaNYL (DURAGESIC) 12 mcg/hr patch 1 Patch  1 Patch TransDERmal Q72H    haloperidol lactate (HALDOL) injection 5 mg  5 mg SubCUTAneous Q6H     Plan:   Comprehensive plan of care reviewed. IDG and pt./family in agreement with plan of care. The IDG identifies through on-going assessment when a change is needed to the POC; the pt/family will receive care and services necessitated by changes in POC. Medications reviewed by the pharmacist and Medical Director.         Signed by: Abbey Zendejas RN       [facility] IDG  Notes by Anna Marie Wright at 12/16/21 1109  Version 1 of 1    Author: Anna Marie Wright Service: Spiritual Care Author Type: Pastoral Care    Filed: 12/16/21 1118 Date of Service: 12/16/21 1109 Status: Signed    : Anna Marie Wright (Pastoral Care)       Patient: Miah Vergara    Date: 12/16/21  Time: 11:09 AM    [facility]  Notes  Intervention: Ministry of presence, prayer, family support. Outcome: During last visit patient was lying still with eyes and mouth open. There was a slight moan.  reported findings to nurse. She stated it was time for medication. She was preparing to see her. Family is not at bedside as often as during her initial admission. Plan: Continue to provide support throughout patient's time with Leti Caal. GOAL: Mendel and her children, Karissa Sanchez will demonstrate appropriate anticipatory grief reactions related to Espinoza's impending death as evidenced by their ability to verbalize feelings associated with grief such as denial, bargaining, anger, and depression. They will display feelings and associated behaviors in a healthy manner during inpatient hospice stay.             Signed by: Hallie BRYANT [facility] IDG Nurse Notes by Kianna Rose RN at 12/13/21 1036  Version 1 of 1    Author: Kianna Rose RN Service: NURSING Author Type: Registered Nurse    Filed: 12/13/21 1045 Date of Service: 12/13/21 1036 Status: Signed    : Kianna Rose RN (Registered Nurse)         Patient: Melvin Clarke    Date: 12/13/21  Time: 10:36 AM    [facility] Nurse Notes  Follow up IDG: Pt is a 62y.o. year-old female with metabolic encephalopathy who is here GIP level of care for management of pain and agitation. Melo with UOP of 325ml   IV access: N/A   PO intake:NPO  Oxygen: Room air  Wounds:  None  PRN medications: Morphine 2mg x 2 doses for pain / Haldol 4mg x 1 dose for agitation / Geodon 5mg x 3 doses for agitation. Scheduled meds:    Current Facility-Administered Medications   Medication Dose Route Frequency    sodium chloride (NS) flush 3 mL  3 mL IntraVENous BID    LORazepam (ATIVAN) injection 1 mg  1 mg IntraVENous Q6H    haloperidol lactate (HALDOL) injection 4 mg  4 mg IntraVENous Q8H    Or    haloperidol (HALDOL) 2 mg/mL oral solution 5 mg  5 mg SubLINGual Q8H     Plan: Change Haldol to 5mg IVQ 6 hours.  Increase Geodon to 10mg IM Q 6 hours PRN. Change Haldol to 5mg IV Q 2 hours PRN. Comprehensive plan of care reviewed. IDG and pt./family in agreement with plan of care. The IDG identifies through on-going assessment when a change is needed to the POC; the pt/family will receive care and services necessitated by changes in POC. Medications reviewed by the pharmacist and Medical Director. Signed by: Penny Marquis RN       [facility] IDG  Notes by Rodo Brand LMSW at 12/13/21 1037  Version 1 of 1    Author: Rodo Brand LMSW Service: -- Author Type: Licensed Masters in Social Work    Filed: 12/13/21 1038 Date of Service: 12/13/21 1037 Status: Signed    : [facility] (Licensed Masters in Social Work)       Patient: Vinnie Correia    Date: 12/13/21  Time: 10:37 AM    \[facility]\""  Notes  Pt remains GIP LOC. No changes in the plan of care. SW will continue to provide ongoing emotional support and assessment of psychosocial and bereavement concerns, as well as needs until discharge. Signed by: Mayra Allen LMSW       [facility] IDG  Notes by Thalia Kumar at 12/13/21 1035  Version 1 of 1    Author: Thalia Kumar Service: Spiritual Care Author Type: Pastoral Care    Filed: 12/13/21 1037 Date of Service: 12/13/21 1035 Status: Signed    : Thalia Kumar (Pastoral Care)       Patient: Vinnie Correia    Date: 12/13/21  Time: 10:35 AM    \[facility]\""  Notes    Intervention: Ministry of presence, initial assessments completed, family care with active listening and education, compassion and prayer. Outcome  Meaningful time with family. Plan: Continue to provide spiritual and emotional support.       GOAL: Mendel and her children, Nicki Silva will demonstrate appropriate anticipatory grief reactions related to Espinoza's impending death as evidenced by their ability to verbalize feelings associated with grief such as denial, bargaining, anger, and depression. They will display feelings and associated behaviors in a healthy manner during inpatient hospice stay. Interventions:  will assess grief reactions with each visit.  will provide opportunities for meaningful, nonjudgmental conversations and education. Signed by: Rishabh Clark        Signed by: Rishabh Clark       [facility] IDG  Notes by Veronica Bennett at 12/10/21 1117  Version 1 of 1    Author: Veronica Bennett Service: Spiritual Care Author Type: Pastoral Care    Filed: 12/10/21 1130 Date of Service: 12/10/21 1117 Status: Signed    : Veronica Bennett (Pastoral Care)       Patient: Selam Zapata    Date: 12/10/21  Time: 11:17 AM    [facility]  Notes  Intervention: Ministry of presence, initial assessments completed, family care with active listening and education, compassion and prayer. Coordination with medical director for urgent intervention due to not being able to calm patient. Prayer, provided \" Gone From My Sight\". Provided bereavement information. Identified [employer]. Outcome:   Conversation with family. Medical Director came to room assessed and adjusted medication. Family expressed appreciation for support. Son inquired about bereavement support. Plan: Continue to provide spiritual and emotional support. GOAL: Mendel and her children, Jesus Kuo will demonstrate appropriate anticipatory grief reactions related to Espinoza's impending death as evidenced by their ability to verbalize feelings associated with grief such as denial, bargaining, anger, and depression. They will display feelings and associated behaviors in a healthy manner during inpatient hospice stay. Interventions:  will assess grief reactions with each visit.  will provide opportunities for meaningful, nonjudgmental conversations and education.                 Signed by: Rishabh JAYKAMRAN [facility] IDG  Notes by Rose Marie Morillo LMSW at 12/10/21 1114  Version 1 of 1    Author: Rose Marie Morillo LMSW Service: -- Author Type: Licensed Masters in Social Work    Filed: 12/10/21 1114 Date of Service: 12/10/21 1114 Status: Signed    : María Chen [facility] (Licensed Masters in Social Work)       Patient: Joan Morrell    Date: 12/10/21  Time: 11:14 AM    [facility]  Notes  Pt remains GIP LOC. No changes in the plan of care. SW will continue to provide ongoing emotional support and assessment of psychosocial and bereavement concerns, as well as needs until discharge. Signed by: Crissy Crum LMSW       [facility] IDG  Notes by Jami Valenzuela at 12/09/21 1418  Version 1 of 1    Author: Jami Valenzuela Service: Spiritual Care Author Type: Pastoral Care    Filed: 12/09/21 1419 Date of Service: 12/09/21 1418 Status: Signed    : Jami Valenzuela (Pastoral Care)       Patient: Joan Morrell    Date: 12/09/21  Time: 2:18 PM    [facility]  Notes  / Grief Counselor has reviewed  Initial Comprehensive Assessment and plan of care. Bereavement and Spiritual Care Assessments to be completed and plan of care put in place to meet patient and family needs. Signed by: Rashel Lorenzo       [facility] IDG  Notes by Rose Marie Morillo LMSW at 12/08/21 1008  Version 1 of 1    Author: Rose Marie Morillo LMSW Service: -- Author Type: Licensed Masters in Social Work    Filed: 12/08/21 1009 Date of Service: 12/08/21 1008 Status: Signed    : María Chen [facility] (Licensed Masters in Social Work)       Patient: Joan Mrorell    Date: 12/08/21  Time: 10:08 AM    [facility]  Notes  SW has read the initial comprehensive assessment and plan of care. No immediate needs noted. Pt needs an  for sign language and SW will ensure an  is available before completing assessment with pt, if applicable.  Initial SW assessment visit will be completed within 5 days of admission. Signed by: Belton Epley, LMSW       [facility] IDG Nurse Notes by Emeli Cruz RN at 21  Version 1 of 1    Author: Emeli Cruz RN Service: Hospice and Palliative Care Author Type: Registered Nurse    Filed: 21 Date of Service: 21 Status: Signed    : Emeli Cruz RN (Registered Nurse)       Patient: Jose Mention    Date: 21  Time: 11:05 PM    [facility] Nurse Notes  Monica Callahan, 62 y.o. F. Arrived from [place] to room 101 at the [facility]. Pt medicated prior to transfer with Morphine 2 mg IVP. Pt admitted with DX: Sepsis and aspiration pneumonia. LOC: GIP. Pt is a DNR. Pt transferred to bed. Pt repositioned. Pt with eyes opened. Non verbal. No facial grimace. Flacc =0-1. Resp irreg non labored on 2 L n/c. Lungs with bilateral rales. HR irreg. BS active. No edema noted at this time. Melo cath draining orange clear urine. BLE with contractures. Allevyn dressings no rachel  Areas and back for protection. Peripheral line flushed with 3 ml ns. Flushed well. Dressing clean/dry/intact. Admission complete and initial General Hospice care initiated which includes spiritual,psychosocial, bereavement, Md, and IDG team. IDG team made aware of plan of care and immediate needs. Pt is under GIP level of care. Will continue to assess need for change of level of care. Collaborate with IDG team regarding discharge planning. Pt is expected to  under GIP level of care. Reviewed care plan with CNA. Signed by: Grace Deutsch RN                Care Team Present:   Team Members Present: Physician,Nurse,,,Vol Coordinator  Physician Team Member: Dr. Derik Keys  Nurse Team Member: Lance Metzger  Social Work Team Member: Belton Epley   Team Member: Geovanny Jiménez Coordinator: Dillon Fatima  Other Discipline Present (Name):  Bri Montez NP

## 2021-12-16 NOTE — PROGRESS NOTES
Problem: Pressure Injury - Risk of  Goal: *Prevention of pressure injury  Description: Patient Mendel Rainelle Abe will remain free from acquired pressure injuries AEB zero acquired pressure injuries during assessment of skin each shift during inpatient hospice stay. Outcome: Progressing Towards Goal  Note: Pressure Injury Interventions:  Sensory Interventions: Assess changes in LOC,Float heels,Keep linens dry and wrinkle-free    Moisture Interventions: Absorbent underpads,Internal/External urinary devices,Limit adult briefs    Activity Interventions: Pressure redistribution bed/mattress(bed type)    Mobility Interventions: Float heels,Pressure redistribution bed/mattress (bed type)    Nutrition Interventions: Document food/fluid/supplement intake,Offer support with meals,snacks and hydration    Friction and Shear Interventions: Apply protective barrier, creams and emollients,HOB 30 degrees or less,Minimize layers                Problem: Pain  Goal: Assess satisfaction of level of comfort and symptom control  Description: Patient Mendel Rainelle Abe will exhibit decrease in pain AEB rating pain less than 3 on 1-10 scale or 3 FLACC score within each shift of receiving pain medication during inpatient hospice stay. Outcome: Progressing Towards Goal     Problem: Anxiety/Agitation  Goal: Verbalize or staff assess the ability to manage anxiety  Description: Patient Mendel Rainelle Abe will demonstrate appropriate motor behavior AEB less than 2 episodes of fidgety, picking or pulling at clothes on devices, yelling out, getting out of bed, etc. each shift during inpatient hospice stay.    Outcome: Progressing Towards Goal     Problem: Infection - Risk of, Urinary Catheter-Associated Urinary Tract Infection  Goal: *Absence of infection signs and symptoms  Description: Patient Mendel Rainelle Abe will remain free from infection R/T urinary catheter AEB absence of signs and symptoms of infection each shift during inpatient hospice stay. Outcome: Progressing Towards Goal     Problem: Dyspnea Due to End of Life  Goal: Demonstrate understanding of and ability to manage respiratory symptoms at end of life  Description: Patient Mendel Leland Galley will indicate effective breathing pattern AEB absence of respiratory distress each shift during inpatient hospice stay. Outcome: Progressing Towards Goal     Problem: Falls - Risk of  Goal: *Absence of Falls  Description: Patient Mendel Leland Galley will remain free from falls AEB no injuries r/t falls each shift during inpatient hospice stay.     Outcome: Progressing Towards Goal  Note: Fall Risk Interventions:  Mobility Interventions: Bed/chair exit alarm    Mentation Interventions: Adequate sleep, hydration, pain control,Bed/chair exit alarm,Door open when patient unattended    Medication Interventions: Bed/chair exit alarm    Elimination Interventions: Bed/chair exit alarm,Call light in reach,Toileting schedule/hourly rounds

## 2021-12-17 NOTE — PROGRESS NOTES
0700- Report received, patient resting quietly in bed with no s/sx pain, dyspnea, agitation, or distress. Mouth open and eyes open, neck hyperextended. Call light within reach. Bed is low and locked, sr up x 3, tab alert in place, and door left open for continuous monitoring. Patient is currently under GIP level of care appropriately, and expected to pass at [facility]. Will continue to assess need for change in LOC / discharge and collaborate with IDG team accordingly. Care plan reviewed with CNA. 0830- patient turned to L side. Barrier cream applied to back and legs over bony prominences. Mouth care and medina care provided. Patient with eyes and mouth wide open. Did not respond to care other than swallowing motion with mouth care. 1022- no s/sx of pain or distress. Mouth and eyes remain open. 1124- scheduled haldol administered sq. No acute changes at this time. Mouth care provided. Patient turned to R side. 1240- patient starting to moan and grimace. Arms rigid prn morphine given for pain and prn haldol given sq for agitation. Mouth care provided. flacc 9/10    1306- additional dose of prn morphine administered sq for continued grimacing, moaning, and tenseness. flacc 9/10. Patient turned to L side. 1345- flacc 0/10. Patient now resting quietly and relaxed. No s/sx of pain or distress. 1425- no s/sx of pain or distress. Vitals taken. Patient gurgling. 1630- no s/sx of pain or distress. 1701- scheduled medications administered. No acute changes. Patient repositioned to floated position. [phone]- patient daughters have arrived and they are tearful. Caitlyn asked to speak to  or the doctor about change in [address]. Dr Lewis Quinonez asked to come to bedside and Echo Guevara asked to come to bedside for emotional support. Both are at bedside. 1855- mouth care provided. No other needs at this time. Daughters remain at bedside.  Report given to Chaka Rebolledo

## 2021-12-17 NOTE — PROGRESS NOTES
SONU attempted to call pt's daughter Lianna Tavarez to discuss possible LOC change that may happen today- from GIP to routine LOC. The number in the chart was not working. SONU was able to reach Caitlyn on ). Caitlyn and SONU discussed the possible change in LOC, SONU reported that she will confirm with Dr. Aida Cano and then will call Caitlyn back. SONU explained that room and board charges will begin on Sunday 12/19 if there are no changes between now and then. SONU and Caitlyn also discussed other Medicaid bed options. SNOU will talk with Dr. Aida Cano and then call Lianna Tavarez back today with an update and to discuss next steps. 1424: SONU spoke with Caitlyn and confirmed that pt has changed to routine LOC. SONU went over the reason for LOC change and room and board charges. SONU discussed that the pt will be billed and it will go through probate if need be. Caitlyn voiced understanding. Nadiya Zuniga know SONU will call her on Monday to give an update on LOC and if there were any changes with LOC over the weekend. SONU assessed for other needs and none are noted at this time.

## 2021-12-17 NOTE — PROGRESS NOTES
1900 Bedside report received from Rober Hinkle RN. Pt identified by name and . Pt diagnosis of Sepsis and aspiration pneumonia under GIP care. Pt alert with extreme confusion. Complete care. Melo catheter in place draining yellow urine. Pt restless and moaning loudly. RR shallow and non labored. No NVD or SOB. FLACC 6/10. Bed in lowest and locked position with all safety measures in place. Pt is at [facility] level of care, no change to pt discharge plan. Pt to stay at [facility] until passing. Plan of care reviewed with CNA. 2107 Pt resting in bed with eyes open and mouth open; no signs of pain or distress noted. RR shallow and non labored. No signs of NVD or SOB. FLACC 0/10. Will continue to monitor. 2303 Pt given scheduled medication as per MD orders. Pt noted moaning and facial grimacing; PRN Morphine given to manage symptoms. RR shallow and non labored. No signs of NVD or SOB. FLACC 6/10. Will continue to monitor. 2333 Pt resting comfortably in bed; no signs of pain or distress noted. RR shallow and non labored. No signs of NVD or SOB. FLACC 0/10. [facility] continue to monitor. 0111 Pt resting comfortably in bed; no signs of pain or distress noted. RR shallow and non labored. No signs of NVD or SOB. FLACC 0/10. [facility] continue to monitor. 0336 Pt resting comfortably in bed; no signs of pain or distress noted. RR shallow and non labored. No signs of NVD or SOB. FLACC 0/10. [facility] continue to monitor. 0549 Pt given scheduled Haldol and Geodon as ordered. Pt resting comfortably in bed; no signs of pain or distress noted. RR shallow and non labored. No signs of NVD or SOB. FLACC 0/10. [facility] continue to monitor. 3094 Pt resting in bed with eyes open and mouth open; no signs of pain or distress noted. RR shallow and non labored. No signs of NVD or SOB. FLACC 0/10. Will continue to monitor.     Bedside report given to Juanita Harris RN.

## 2021-12-17 NOTE — PROGRESS NOTES
received notification from [address]  That patient's children were  Needing support. Mar Tapia, Ms. Martino's daughter's were in the room and were very tearful. They asked questions about changes in mom.  carefully spoke of end of life transition as we waited for the medical director, Pamela Calvillo MD. to arrive. Kash Jiménez is hearing impaired and very shy. She would come over about two feet from mom's bed and then back up. Caitlyn signed to Kash Jiménez what  was saying. We  Paused for prayer together with [address] signing for her sister. During prayer Dr. Milena Garduno arrived and stood quietly by until  Prayer was finished. Dr. Milena Garduno answered medical and administrative questions around Level of Care and expense. It appears he was able to redirect them to focus on mom rather than the expense at the moment. He shared that mom's time  On earth is coming near the end. Both Caitlyn and Kash Jiménez had a very difficult time hearing the news. [address] acknowledged she knew In her head that it was coming but she just couldn't find a way to prepare for these moments. Dr. Milena Garduno and [address] [place] [address] Road affirmed that there is no real way to prepare. Dr. Milena Garduno explained the changes in [address] chemistry and how it contributes to the dying process. After Dr. Milena Garduno left  talked with [address] about her brother. She said she was going to call him.  encouraged them to spend time with mom making the experience of their presence the way they feel mom would want it to be. Caitlyn said she didn't think she wanted to see mom's last breath taken.  suggested she talk with the nurses and have them give updates about changes taking place as much as possible. Caitlyn vacillates between being here for the last moments and not wanting to see mom die.  encouraged her to do what she felt was right for her.  assured them of continued prayers and availability.

## 2021-12-17 NOTE — PROGRESS NOTES
I saw Mrs. Faye Marie on afternoon rounds following discussion of case management during IDG this morning. Having started fentanyl 12 mcg transdermal patch yesterday at 20:00, the full effect of the added opioid analgesic has yet to be seen. She is still requiring as needed morphine to address incident pain noted with body care and repositioning. We have in stepwise fashion uptitrated ziprasidone to 10 mg subcu every 12 hours while continuing high-dose Haldol 5 mg subcu every 6 hours. This appears to have provided control of distress associated with chronic schizophrenia previously achieved with Seroquel 300 mg daily. Family is aware of the patient's complete events of oral fluid intake. She does not respond to my  touch. Her mouth is aerated and her urine output is less than 300 mL/day. 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 Her son visited yesterday and the family is aware of her clinical trajectory. Consider up titration of fentanyl dose if patient continues to require frequent doses of morphine for abdomen pain.

## 2021-12-17 NOTE — PROGRESS NOTES
Problem: Pressure Injury - Risk of  Goal: *Prevention of pressure injury  Description: Patient Mendel Burdette Li will remain free from acquired pressure injuries AEB zero acquired pressure injuries during assessment of skin each shift during inpatient hospice stay. Outcome: Progressing Towards Goal  Note: Pressure Injury Interventions:  Sensory Interventions: Assess changes in LOC,Avoid rigorous massage over bony prominences,Check visual cues for pain,Float heels,Minimize linen layers    Moisture Interventions: Absorbent underpads,Apply protective barrier, creams and emollients,Limit adult briefs,Minimize layers,Moisture barrier    Activity Interventions: Pressure redistribution bed/mattress(bed type)    Mobility Interventions: Float heels,HOB 30 degrees or less,Pressure redistribution bed/mattress (bed type)    Nutrition Interventions: Document food/fluid/supplement intake    Friction and Shear Interventions: Apply protective barrier, creams and emollients,Lift sheet,Minimize layers                Problem: Risk for Falls  Goal: Free of falls during inpatient stay  Description: Patient will be free of falls during inpatient stay. Interventions: tab alert in place. Siderails up x 3. Problem: Pain  Goal: Assess satisfaction of level of comfort and symptom control  Description: Patient Mendel Burdette Li will exhibit decrease in pain AEB rating pain less than 3 on 1-10 scale or 3 FLACC score within each shift of receiving pain medication during inpatient hospice stay. Interventions: scheduled and prn medication  Outcome: Progressing Towards Goal     Problem: Anxiety/Agitation  Goal: Verbalize or staff assess the ability to manage anxiety  Description: Patient Mendel Burdette Li will demonstrate appropriate motor behavior AEB less than 2 episodes of fidgety, picking or pulling at clothes on devices, yelling out, getting out of bed, etc. each shift during inpatient hospice stay. Interventions:    Scheduled and prn medication  Problem: Infection - Risk of, Urinary Catheter-Associated Urinary Tract Infection  Goal: *Absence of infection signs and symptoms  Description: Patient Mendel Everlean Coles will remain free from infection R/T urinary catheter AEB absence of signs and symptoms of infection each shift during inpatient hospice stay. Intervention: medina care q shift  Outcome: Progressing Towards Goal    Problem: Dyspnea Due to End of Life  Goal: Demonstrate understanding of and ability to manage respiratory symptoms at end of life  Description: Patient Mendel Everlean Coles will indicate effective breathing pattern AEB absence of respiratory distress each shift during inpatient hospice stay.      Intervention:    Prn medication  Outcome: Progressing Towards Goal

## 2021-12-17 NOTE — PROGRESS NOTES
I saw the patient on afternoon rounds and discussed care with her case managing nurse Mita Olmedo. The patient's hands and feet remain warm but her blood pressures dropped to 99 systolic and urine output continues to decline. She continues to require 1-2 doses of subcu morphine for incident pain and this has been stable for the last 3 days. I conclude the fentanyl dose is adequate for her to traverse the last one or 2 days of life without severe pain or dyspnea. Psychomotor distress is minimal. Patient is sleeping for more than three fourths of the day and night. Her demise is expected within 2 to 3 days. I have ordered change of her hospice level of care from GIP to routine level. This reflects satisfactory titration of her antipsychotic and opioid analgesic. Although these are parenteral medications there doses of brain fixed for the last 72 hours. Family has been made aware of the modified level of care and short prognosis.

## 2021-12-18 NOTE — PROGRESS NOTES
Problem: Pressure Injury - Risk of  Goal: *Prevention of pressure injury  Description: Patient Mendel Raynelle Oak will remain free from acquired pressure injuries AEB zero acquired pressure injuries during assessment of skin each shift during inpatient hospice stay. Outcome: Progressing Towards Goal  Note: Pressure Injury Interventions:  Sensory Interventions: Assess changes in LOC,Avoid rigorous massage over bony prominences,Check visual cues for pain,Float heels,Minimize linen layers    Moisture Interventions: Absorbent underpads,Apply protective barrier, creams and emollients,Limit adult briefs,Minimize layers,Moisture barrier    Activity Interventions: Pressure redistribution bed/mattress(bed type)    Mobility Interventions: Float heels,HOB 30 degrees or less,Pressure redistribution bed/mattress (bed type)    Nutrition Interventions: Document food/fluid/supplement intake    Friction and Shear Interventions: Apply protective barrier, creams and emollients,Lift sheet,Minimize layers                Problem: Risk for Falls  Goal: Free of falls during inpatient stay  Description: Patient will be free of falls during inpatient stay. Interventions: tab alert in place. Siderails up x 3. Problem: Pain  Goal: Assess satisfaction of level of comfort and symptom control  Description: Patient Mendel Raynelle Oak will exhibit decrease in pain AEB rating pain less than 3 on 1-10 scale or 3 FLACC score within each shift of receiving pain medication during inpatient hospice stay. Interventions: scheduled and prn medication  Outcome: Progressing Towards Goal     Problem: Anxiety/Agitation  Goal: Verbalize or staff assess the ability to manage anxiety  Description: Patient Mendel Raynelle Oak will demonstrate appropriate motor behavior AEB less than 2 episodes of fidgety, picking or pulling at clothes on devices, yelling out, getting out of bed, etc. each shift during inpatient hospice stay. Interventions:    Scheduled and prn medication  Problem: Infection - Risk of, Urinary Catheter-Associated Urinary Tract Infection  Goal: *Absence of infection signs and symptoms  Description: Patient Mendel Rainelle Abe will remain free from infection R/T urinary catheter AEB absence of signs and symptoms of infection each shift during inpatient hospice stay. Intervention: medina care q shift  Outcome: Progressing Towards Goal    Problem: Dyspnea Due to End of Life  Goal: Demonstrate understanding of and ability to manage respiratory symptoms at end of life  Description: Patient Mendel Rainelle Abe will indicate effective breathing pattern AEB absence of respiratory distress each shift during inpatient hospice stay.      Intervention:    Prn medication  Outcome: Progressing Towards Goal

## 2021-12-18 NOTE — PROGRESS NOTES
1915  Received report from off going RN  Identified pt by name and date of birth. GIP level of care admitted for hospice dx of Sepsis and Aspiration Pneumonia Hospice is  to manage pain agitation and dyspnea   Pt plans to remain at [facility] until passing. LOC will be reviewed often for signs of change in LOC   Plan of Care was reviewed. Safety measures such as tab alert,  bed in low/locked position , side rails x 3, door open  are in place. Pt has Melo draining clear yellow urine. Pt is obtunded    Fentanyl patch on left arm. Family is at bedside. 1930 Family left to go eat   They will return later this evening. 2049  Pts respirations labored. PRN Morphine 2 mg SC given. 2115 Pts respirations less labored. 2221  Family at bedside. Asked for update   Update given. Family very tearful     18  Pts family left for the night. Asked to be contacted if pt passes      0002  Pt having labored breathing. PRN Morphine 2 mg SC  Given for dyspnea. Pt having apneic periods of 14 seconds. 0030   Pts respirations less labored. 0315  Pt remains the same. Breathing is shallow with periods of apnea.       0329  Pt moaning    PRN Morphine 2 mg SC given for pain       0500  Pt repositioned.   Remains apneic       Report given to oncoming RN

## 2021-12-18 NOTE — PROGRESS NOTES
Problem: Pressure Injury - Risk of  Goal: *Prevention of pressure injury  Description: Patient Mendel Roetta Brine will remain free from acquired pressure injuries AEB zero acquired pressure injuries during assessment of skin each shift during inpatient hospice stay. Outcome: Progressing Towards Goal  Note: Pressure Injury Interventions:  Sensory Interventions: Assess changes in LOC,Avoid rigorous massage over bony prominences,Check visual cues for pain,Float heels,Minimize linen layers    Moisture Interventions: Absorbent underpads,Apply protective barrier, creams and emollients,Limit adult briefs,Minimize layers,Moisture barrier    Activity Interventions: Pressure redistribution bed/mattress(bed type)    Mobility Interventions: Float heels,HOB 30 degrees or less,Pressure redistribution bed/mattress (bed type)    Nutrition Interventions: Document food/fluid/supplement intake    Friction and Shear Interventions: Apply protective barrier, creams and emollients,Lift sheet,Minimize layers                Problem: Risk for Falls  Goal: Free of falls during inpatient stay  Description: Patient will be free of falls during inpatient stay. Outcome: Progressing Towards Goal     Problem: Pain  Goal: Assess satisfaction of level of comfort and symptom control  Description: Patient Mendel Roetta Brine will exhibit decrease in pain AEB rating pain less than 3 on 1-10 scale or 3 FLACC score within each shift of receiving pain medication during inpatient hospice stay. Outcome: Progressing Towards Goal  Goal: *Control of acute pain  Outcome: Progressing Towards Goal     Problem: Anticipatory Grief  Goal: Explore reactions to and verbalize acceptance of impending loss  Description: Patient/family/caregiver will explore reactions to and verbalize acceptance of impending loss.   Outcome: Progressing Towards Goal     Problem: Anxiety/Agitation  Goal: Verbalize or staff assess the ability to manage anxiety  Description: Patient Mendel Burdette Li will demonstrate appropriate motor behavior AEB less than 2 episodes of fidgety, picking or pulling at clothes on devices, yelling out, getting out of bed, etc. each shift during inpatient hospice stay. Outcome: Progressing Towards Goal     Problem: Communication Deficit  Goal: Effectively communicate symptoms, needs, and concerns  Description: Patient/family/caregiver will effectively communicate symptoms, needs and concerns. Outcome: Progressing Towards Goal     Problem: Coping and Emotional Distress  Goal: Demonstrate acceptance of terminal illness and understanding of disease progression  Description: Patient/family/caregiver will demonstrate acceptance of terminal disease and understanding of disease progression while employing appropriate coping mechanisms. Outcome: Progressing Towards Goal     Problem: Spiritual Distress  Goal: Distress heard, acknowledged, and addressed  Description: Patient/family/caregiver distress will be heard, acknowledged, and addressed throughout hospice care. Outcome: Progressing Towards Goal     Problem: Breathing Pattern - Ineffective  Goal: *Use of effective breathing techniques  Outcome: Progressing Towards Goal     Problem: Grieving  Goal: *Able to express feelings of grief  Outcome: Progressing Towards Goal  Goal: *Able to identify stages of grieving process  Outcome: Progressing Towards Goal     Problem: Nausea/Vomiting (Adult)  Goal: *Absence of nausea/vomiting  Outcome: Progressing Towards Goal     Problem: Infection - Risk of, Urinary Catheter-Associated Urinary Tract Infection  Goal: *Absence of infection signs and symptoms  Description: Patient Mendel Burdette Li will remain free from infection R/T urinary catheter AEB absence of signs and symptoms of infection each shift during inpatient hospice stay.     Outcome: Progressing Towards Goal     Problem: End of Life Process  Goal: Demonstrate understanding of end of life processes  Description: Patient/caregiver will understand end of life processes. Outcome: Progressing Towards Goal     Problem: Dyspnea Due to End of Life  Goal: Demonstrate understanding of and ability to manage respiratory symptoms at end of life  Description: Patient Mendel Wanita Relic will indicate effective breathing pattern AEB absence of respiratory distress each shift during inpatient hospice stay. Outcome: Progressing Towards Goal     Problem: Imminent Death  Goal: Collaborate with patient/family/caregiver/interdisciplinary team to minimize and manage end of life symptoms  Outcome: Progressing Towards Goal     Problem: Discharge Planning  Goal: *Participates in discharge planning  Outcome: Progressing Towards Goal     Problem: Falls - Risk of  Goal: *Absence of Falls  Description: Patient Mendel Wanita Relic will remain free from falls AEB no injuries r/t falls each shift during inpatient hospice stay.     Outcome: Progressing Towards Goal  Note: Fall Risk Interventions:  Mobility Interventions: Bed/chair exit alarm    Mentation Interventions: Adequate sleep, hydration, pain control,Bed/chair exit alarm,Door open when patient unattended,Evaluate medications/consider consulting pharmacy    Medication Interventions: Evaluate medications/consider consulting pharmacy,Bed/chair exit alarm    Elimination Interventions: Bed/chair exit alarm,Toileting schedule/hourly rounds

## 2021-12-18 NOTE — PROGRESS NOTES
0700- Report received, patient resting quietly in bed with no s/sx pain, dyspnea, agitation, or distress. Patient pale/gray in color, neck hyperextended, mouth open and eyes open. Call light within reach. Bed is low and locked, sr up x 3, tab alert in place, and door left open for continuous monitoring. Pt with hospice diagnosis of metabolic encephalopathy. Patient was changed to routine level of care as she is no longer requiring aggressive symptom management. Her symptoms have been controlled with current regimen and few prns. Patient is actively dying and expected to pass at [facility]. Will continue to assess need for change in LOC / discharge and collaborate with IDG team accordingly. Care plan reviewed with CNA.       0433- no s/sx of pain or distress. No acute changes noted. 1025- no s/sx of pain or distress. No acute changes noted. 1215- no s/sx of pain or distress. No acute changes noted. 1308- scheduled haldol given sq. Dr. Fariha Macias in to see patient. Patient turned to R side and mouth care provided. Nonresponsive, except to mouth care. Attempted to close eyes, and patient opened them. Received new order for eye drops. See mar. 1525- no s/sx of pain or distress. No acute changes noted. 1630- no s/sx of pain or distress. No acute changes. 65- pt family in room. Scheduled medications given. Patient slightly withdrew with med admin. Patient turned to L side. Mouth care provided. 1837- prn eye drops given.      Report given to Melanie Conrad

## 2021-12-19 NOTE — PROGRESS NOTES
Problem: Pressure Injury - Risk of  Goal: *Prevention of pressure injury  Description: Patient Mendel Sabino Carnes will remain free from acquired pressure injuries AEB zero acquired pressure injuries during assessment of skin each shift during inpatient hospice stay. Outcome: Progressing Towards Goal  Note: Pressure Injury Interventions:  Sensory Interventions: Assess changes in LOC,Avoid rigorous massage over bony prominences,Check visual cues for pain,Float heels,Minimize linen layers    Moisture Interventions: Absorbent underpads,Apply protective barrier, creams and emollients,Limit adult briefs,Minimize layers,Moisture barrier    Activity Interventions: Pressure redistribution bed/mattress(bed type)    Mobility Interventions: Float heels,HOB 30 degrees or less,Pressure redistribution bed/mattress (bed type)    Nutrition Interventions: Document food/fluid/supplement intake    Friction and Shear Interventions: Apply protective barrier, creams and emollients,Lift sheet,Minimize layers                Problem: Risk for Falls  Goal: Free of falls during inpatient stay  Description: Patient will be free of falls during inpatient stay. Interventions: tab alert in place. Siderails up x 3. Problem: Pain  Goal: Assess satisfaction of level of comfort and symptom control  Description: Patient Mendel Sabino Carnes will exhibit decrease in pain AEB rating pain less than 3 on 1-10 scale or 3 FLACC score within each shift of receiving pain medication during inpatient hospice stay. Interventions: scheduled and prn medication  Outcome: Progressing Towards Goal     Problem: Anxiety/Agitation  Goal: Verbalize or staff assess the ability to manage anxiety  Description: Patient Mendel Sabino Carnes will demonstrate appropriate motor behavior AEB less than 2 episodes of fidgety, picking or pulling at clothes on devices, yelling out, getting out of bed, etc. each shift during inpatient hospice stay. Interventions:    Scheduled and prn medication  Problem: Infection - Risk of, Urinary Catheter-Associated Urinary Tract Infection  Goal: *Absence of infection signs and symptoms  Description: Patient Mendel Fritzi Bracken will remain free from infection R/T urinary catheter AEB absence of signs and symptoms of infection each shift during inpatient hospice stay. Intervention: medina care q shift  Outcome: Progressing Towards Goal    Problem: Dyspnea Due to End of Life  Goal: Demonstrate understanding of and ability to manage respiratory symptoms at end of life  Description: Patient Mendel Fritzi Bracken will indicate effective breathing pattern AEB absence of respiratory distress each shift during inpatient hospice stay.      Intervention:    Prn medication  Outcome: Progressing Towards Goal

## 2021-12-19 NOTE — PROGRESS NOTES
1920 Received report from off going RN  Identified pt by name and date of birth. Routine level of care admitted for hospice dx of Sepsis and Aspiration Pneumonia Hospice is  to manage pain agitation and dyspnea   Pt plans to remain at [facility] until passing. [facility] will be reviewed often for signs of change in [address]   Plan of Care was reviewed.     Safety measures such as tab alert,  bed in low/locked position , side rails x 3, door open  are in place. Pt has Melo draining clear yellow urine. Pt is obtunded    Fentanyl patch on left arm. Family is at bedside. 1952 Replaced Fentanyl patch - now on Pts right chest       2315  Pt resting with eyes closed. No signs of distress noted  All safety measures in place. 0251 Pt Resting quietly with eyes closed. Family at bedside. 0600  Pt remains unchanged. Resting quietly in bed   No signs of distress noted. All safety measures in place.     Scheduled Medication given   Mouth care provided no

## 2021-12-19 NOTE — PROGRESS NOTES
0700- Report received, patient resting quietly in bed with no s/sx pain, dyspnea, agitation, or distress. Patient pale/gray in color, neck hyperextended, mouth open and eyes open. Pt more responsive today. She is blinking and responding to mouth care by sucking on sponge. Call light within reach. Bed is low and locked, sr up x 3, tab alert in place, and door left open for continuous monitoring. Pt with hospice diagnosis of metabolic encephalopathy. Patient is currently under routine level of care. Patient is actively dying and expected to pass at [facility]. Will continue to assess need for change in LOC / discharge and collaborate with IDG team accordingly. Care plan reviewed with CNA. 0730- prn eye drops given for dryness. 0800- patient heard with loud breathy grunts and hands tense. Mouth care provided. Patient turned to R side. Patient no longer grunting and hands relaxed. 1038- patient moaning and grimacing, arms tense and body moving. Patient repositioned to floated position, mouth caare provided and prn haldol and morphine given for pain and agitation. flacc 8/10.    1045- prn eye drops given for comfort. 1140- patient resting with eyes open, face relaxed, no grimacing or moaning, arms relaxed. flacc 0/10. Symptoms have been resolved. 1226- scheduled haldol administered. Patient awake with loud breathing. Moved arm with touch. Mouth care provided. 1353- patient grimacing and moaning, arms stiff. Prn morphine given sq and prn ativan given im. Eyed drops given. flacc 7/10.    1400- constipation noted. Patient disimpacted prn dulcolax given as patient has more stool in rectum. Turned to R side. 1455- patient resting. flacc 0/10. No s/sx of pain or distress. 1620- patient floated on pillows. No stool output noted. No s/sx of pain or distress. 1715- no s/sx of pain or distress. 1937- scheduled medication given mouth car provided. No bm noted. No s/sx of pain or distress.  Caitlyn and multiple family members at bedside. Caitlyn given update. 1941- prn eye drops administered.     Report given to Michelle Chinchilla

## 2021-12-20 NOTE — PROGRESS NOTES
[address] received from Tiffany Mixon RN. Patient identified by name and . Patient is currently admitted under routine  level of care with diagnosis of  Metabolic encephalopathy. Patient is lying in bed with eyes closed in no acute distress noted. No current signs or symptoms of pain, anxiety, agitation, dyspnea, or nausea/vomiting. Patient is currently on room air and has shallow respirations, she does not appear to be in any respiratory distress. Melo to gravity drain with yellow urine draining. FLAAC 0/10. Patient has multiple family members at there bedside and they all deny any current needs at this time. Patient repositioned for comfort. Reviewed plan of care with CNA for this shift with understanding voiced. Discharge Plan is to remain at [facility] until she meets her demise. Reassessment of proper LOC will be done on an ongoing basis. Safety measures in place including, door open for continuous monitoring, bed in low locked position, tab alert in place, call light within reach, and side rails up x2. Will continue to monitor for changes, safety, and needs. 2249 Patient is lying in bed with eyes open and respirations remain shallow and regular. Patient appears to be comfortable and in no acute distress at this time. Patient is keeping her eyes open all of the time, her eyes appear red and dry. Saline eye drops placed in both eyes for comfort. Safety measures in place including, door open for continuous monitoring, bed in low locked position, tab alert in place, call light within reach, and side rails up x2. Will continue to monitor for changes, safety, and needs. FLACC 0/10.    0118 Patient given her scheduled Haldol per MD order without incident. Patient is lying in bed with eyes open, they remain red and dry. Saline eye drops placed in both eyes for comfort.  Safety measures in place including, door open for continuous monitoring, bed in low locked position, tab alert in place, call light within reach, and side rails up x2. Will continue to monitor for changes, safety, and needs. FLACC 0/10.    0502 Pt observed on rounds, she is resting quietly with eyes closed and respirations even and shallow. No facial grimacing, moaning, or signs of agitation observed. Patient is keeping her eyes open all of the time, her eyes appear red and dry. Saline eye drops placed in both eyes for comfort. All symptoms managed at this time. No acute changes noted. FLACC 0/10. Safety measures remain in place, will continue to monitor for changes, safety, and comfort. 3790 Patient given her scheduled Geodon and Haldol without incident. Patient's eyes remain red and dry, saline eye drops placed in each eye without incident. Safety measures in place including, door open for continuous monitoring, bed in low locked position, tab alert in place, call light within reach, and side rails up x2. Will continue to monitor for changes, safety, and needs. FLACC 0/10.     Report given to oncoming RN

## 2021-12-20 NOTE — PROGRESS NOTES
9194 Bedside Report taken from Rafael Valdez rn.  Pt identified. Pt in bed with eyes closed; displaying no signs or symptoms of pain, dyspnea, agitation,seizures, nausea, or vomiting. FLACC 0. Bed locked and low, side rails up, tabs/bed alarm in place for pt. safety. Call light with in reach, and door opened for continued monitoring. Care plan reviewed with Cna.     3854 Pt observed and appears to be sleeping/resting, no facial grimacing, no moaning, easy relaxed respirations. No symptoms to manage at this time. Flacc 0/10    0930 Pt admitted Gip on 12/7 and subsequently changed to routine level of care on 12/17. Pt plans to remain at [facility] in [facility]. 1046 Pt observed and appears to be sleeping/resting, no facial grimacing, no moaning, easy relaxed respirations. No symptoms to manage at this time. Flacc 0/10.    1135 Pt observed and appears to be sleeping/resting, no facial grimacing, no moaning, easy relaxed respirations. No symptoms to manage at this time. Flacc 0/10. Rounded with Dr. Aylin Cruz. 1301 Pt scheduled medication given. Pt observed and appears to be sleeping/resting, no facial grimacing, no moaning, easy relaxed respirations. No symptoms to manage at this time. Flacc 0/10.    1510 Pt observed and appears to be sleeping/resting, no facial grimacing, no moaning, easy relaxed respirations. No symptoms to manage at this time. Flacc 0/10.    1705 Pt observed and appears to be sleeping/resting, no facial grimacing, no moaning, easy relaxed respirations. No symptoms to manage at this time. Flacc 0/10.    1829 Pt 2 daughters came in. One of them very emotional and upset. Tried to comfort them both. Answered their questions. Flacc 0/10.  Report to be given to [facility]

## 2021-12-20 NOTE — PROGRESS NOTES
Problem: Pressure Injury - Risk of  Goal: *Prevention of pressure injury  Description: Patient Mendel Fritzi Bracken will remain free from acquired pressure injuries AEB zero acquired pressure injuries during assessment of skin each shift during inpatient hospice stay. Outcome: Progressing Towards Goal  Note: Pressure Injury Interventions:  Sensory Interventions: Assess changes in LOC,Avoid rigorous massage over bony prominences,Check visual cues for pain,Float heels,Minimize linen layers    Moisture Interventions: Absorbent underpads,Apply protective barrier, creams and emollients,Limit adult briefs,Minimize layers,Moisture barrier    Activity Interventions: Pressure redistribution bed/mattress(bed type)    Mobility Interventions: Float heels,HOB 30 degrees or less,Pressure redistribution bed/mattress (bed type)    Nutrition Interventions: Document food/fluid/supplement intake    Friction and Shear Interventions: Apply protective barrier, creams and emollients,Lift sheet,Minimize layers                Problem: Risk for Falls  Goal: Free of falls during inpatient stay  Description: Patient will be free of falls during inpatient stay. Outcome: Progressing Towards Goal     Problem: Pain  Goal: Assess satisfaction of level of comfort and symptom control  Description: Patient Mendel Fritzi Bracken will exhibit decrease in pain AEB rating pain less than 3 on 1-10 scale or 3 FLACC score within each shift of receiving pain medication during inpatient hospice stay.    12/20/2021 0809 by Alvaro Corona RN  Outcome: Progressing Towards Goal  12/20/2021 0807 by Alvaro Corona RN  Outcome: Progressing Towards Goal  Goal: *Control of acute pain  12/20/2021 0809 by Alvaro Corona RN  Outcome: Progressing Towards Goal  12/20/2021 0807 by Alvaro Corona RN  Outcome: Progressing Towards Goal     Problem: Infection - Risk of, Urinary Catheter-Associated Urinary Tract Infection  Goal: *Absence of infection signs and symptoms  Description: Patient Mendel Armida Kidd will remain free from infection R/T urinary catheter AEB absence of signs and symptoms of infection each shift during inpatient hospice stay. Outcome: Progressing Towards Goal     Problem: End of Life Process  Goal: Demonstrate understanding of end of life processes  Description: Patient/caregiver will understand end of life processes.   Outcome: Progressing Towards Goal

## 2021-12-21 NOTE — PROGRESS NOTES
Problem: Pressure Injury - Risk of  Goal: *Prevention of pressure injury  Description: Patient Mendel Leland Galley will remain free from acquired pressure injuries AEB zero acquired pressure injuries during assessment of skin each shift during inpatient hospice stay. Outcome: Not Progressing Towards Goal  Note: Pressure Injury Interventions:  Sensory Interventions: Assess changes in LOC,Check visual cues for pain    Moisture Interventions: Absorbent underpads,Apply protective barrier, creams and emollients    Activity Interventions: Pressure redistribution bed/mattress(bed type)    Mobility Interventions: Float heels,Pressure redistribution bed/mattress (bed type)    Nutrition Interventions: Document food/fluid/supplement intake    Friction and Shear Interventions: Apply protective barrier, creams and emollients,Lift sheet                Problem: Risk for Falls  Goal: Free of falls during inpatient stay  Description: Patient will be free of falls during inpatient stay. Outcome: Progressing Towards Goal     Problem: Pain  Goal: Assess satisfaction of level of comfort and symptom control  Description: Patient Mendel Leland Galley will exhibit decrease in pain AEB rating pain less than 3 on 1-10 scale or 3 FLACC score within each shift of receiving pain medication during inpatient hospice stay. Outcome: Progressing Towards Goal     Problem: Anxiety/Agitation  Goal: Verbalize or staff assess the ability to manage anxiety  Description: Patient Mendel Leland Galley will demonstrate appropriate motor behavior AEB less than 2 episodes of fidgety, picking or pulling at clothes on devices, yelling out, getting out of bed, etc. each shift during inpatient hospice stay.    Outcome: Progressing Towards Goal     Problem: Breathing Pattern - Ineffective  Goal: *Use of effective breathing techniques  Outcome: Progressing Towards Goal     Problem: Infection - Risk of, Urinary Catheter-Associated Urinary Tract Infection  Goal: *Absence of infection signs and symptoms  Description: Patient Mendel Rainelle Abe will remain free from infection R/T urinary catheter AEB absence of signs and symptoms of infection each shift during inpatient hospice stay. Outcome: Progressing Towards Goal     Problem: End of Life Process  Goal: Demonstrate understanding of end of life processes  Description: Patient/caregiver will understand end of life processes. Outcome: Progressing Towards Goal     Problem: Dyspnea Due to End of Life  Goal: Demonstrate understanding of and ability to manage respiratory symptoms at end of life  Description: Patient Mendel Rainelle Abe will indicate effective breathing pattern AEB absence of respiratory distress each shift during inpatient hospice stay.      Outcome: Progressing Towards Goal     Problem: Imminent Death  Goal: Collaborate with patient/family/caregiver/interdisciplinary team to minimize and manage end of life symptoms  Outcome: Progressing Towards Goal

## 2021-12-21 NOTE — PROGRESS NOTES
Report received from [address] [place] [address],  visual identification made, assumed care of pt. Pt resting quietly with eyes closed, no agitation or restlessness, no grimacing or groaning. Pt respirations unlabored but shallow and irregular. Tab alert in place, rails up x 2, bed in lowest position, safety maintained. FLACC 0. Fentanyl patch visualized on right chest. Discharge plan is for pt to remain at [facility] under routine level of care until demise. Will continue to evaluate pt discharge plan for potential changes. Pt on alternating air mattress. 2025 pt non responsive, eyes open and red, administered refresh eye drops. Physical assessment completed lung sounds diminished throughout, heart sounds regular and bounding, abdomen flat and absent bowel sounds, medina draining small amount of yellow urine. Upper extremities cool with nonpalpable pulses, lower extremities cold with non palpable pulses. 2334 pt resting quietly, with shallow, irregular respirations. No moaning, groaning or grimacing. 2920 pt resting quietly with shallow, irregular respirations. 3413 with one assist removed one pillow, pt on her left side. Pt did not respond. 0300 pt resting quietly, no grimacing, groaning or agitation. 0522 pt resting with eyes open, mouth open, neck hyperextended. No dyspnea, pain or agitation     0535 with two assist boosted pt up in bed. Administered refresh for reddened eyes that remain continuously open.      Report given to [facility] JACOB

## 2021-12-21 NOTE — PROGRESS NOTES
1700: Report received from off going RN. Pt was admitted on 12/7/2021 for GIP level of care with a hospice diagnosis of metabolic encephalopathy for management of pain, dyspnea and agitation. Pt was changed to routine level of care on 12/17/21. She did not receive any PRN medications this shift. Fentanyl 12mcg/hr patch to right upper chest. Presently is non-responsive, resps are shallow and irregular. No s/sx of pain or other discomfort. FLACC score is 0/10. Pt is in bed with eyes open but not able to track or verbalize and does not respond to noxious stimuli. Discharge plan os for pt to remain at [facility] until her demise unless her condition warrants a change in level of care. 1845: Report given to oncoming RN.

## 2021-12-21 NOTE — PROGRESS NOTES
2926-report received from [place], On license of [facility]. Patient remains under Routine level of care and appears to be transitioning towards demise. Found with eyes open and staring ahead at ceiling; eyes are glassy with coating; patient is unable to keep eyes closed. Does not track or respond. Sunken cheek bones, temporal wasting. Hyperextended neck. Refresh tear drops given and oral care completed for mouth care. Breath with foul odor. Respirations are shallow but regular. No signs of pain or dyspnea/distress. Safety measures include tab alarm, SR X3, bed in l/l position, door open. CNA verbalized understanding of today's POC.     0900- mouth care and eye drops completed for comfort as mouth and eyes are unable to be shut; patient is not blinking. Repositioned to right side with use of several pillows between and beneath bony prominences. Does not respond to repositioning, body is flaccid. Several small round areas of non blanchable pressure areas to spine and back. Periods of apnea with repositioning. Resting comfortably now with even and unlabored respirations; rate 21. Melo draining small amounts of debbie colored urine. 1207-repositoned to left side with support of pillows. Right lateral side with several non blanchable reddened areas. Does not respond, body remains flaccid. Mouth care and eye drops. Radial pulse is felt but weak. Skin is warm with no mottling. RR 22, unlabored. No signs of pain, dyspnea or distress. 1502-repositioned to supine position (floated with pillows). Mouth care; does close mouth around swab. Does not track. reddened areas to heels; floated. Respirations are shallow, no distress. no restless movements. Safety measures remain in place. 1646-continues to rest comfortably with no agitation, distress or signs of pain. Respirations even and unlabored. All safety measures in place. 1650-Report given to JACOB Child.

## 2021-12-21 NOTE — PROGRESS NOTES
Problem: Pressure Injury - Risk of  Goal: *Prevention of pressure injury  Description: Patient Mendel Medora Buzzard will remain free from acquired pressure injuries AEB zero acquired pressure injuries during assessment of skin each shift during inpatient hospice stay. Outcome: Progressing Towards Goal  Note: Pressure Injury Interventions:  Sensory Interventions: Assess changes in LOC,Check visual cues for pain    Moisture Interventions: Absorbent underpads,Apply protective barrier, creams and emollients    Activity Interventions: Pressure redistribution bed/mattress(bed type)    Mobility Interventions: Float heels,Pressure redistribution bed/mattress (bed type)    Nutrition Interventions: Document food/fluid/supplement intake    Friction and Shear Interventions: Apply protective barrier, creams and emollients,Lift sheet                Problem: Risk for Falls  Goal: Free of falls during inpatient stay  Description: Patient will be free of falls during inpatient stay. Outcome: Progressing Towards Goal     Problem: Pain  Goal: Assess satisfaction of level of comfort and symptom control  Description: Patient Mendel Medora Buzzard will exhibit decrease in pain AEB rating pain less than 3 on 1-10 scale or 3 FLACC score within each shift of receiving pain medication during inpatient hospice stay. Outcome: Progressing Towards Goal     Problem: Anxiety/Agitation  Goal: Verbalize or staff assess the ability to manage anxiety  Description: Patient Mendel Medora Buzzard will demonstrate appropriate motor behavior AEB less than 2 episodes of fidgety, picking or pulling at clothes on devices, yelling out, getting out of bed, etc. each shift during inpatient hospice stay.    Outcome: Progressing Towards Goal     Problem: Dyspnea Due to End of Life  Goal: Demonstrate understanding of and ability to manage respiratory symptoms at end of life  Description: Patient Mendel Medora Buzzard will indicate effective breathing pattern AEB absence of respiratory distress each shift during inpatient hospice stay.      Outcome: Progressing Towards Goal

## 2021-12-22 NOTE — PROGRESS NOTES
5513: Report received from off going RN. Pt was admitted on 12/7/2021 for GIP level of care with a hospice diagnosis of metabolic encephalopathy for management of pain, dyspnea and agitation. Pt was changed to routine level of care on 12/17/21. Pt received 2 doses of Morphine 2mg sc to promote comfort. Fentanyl 12mcg/hr patch to left upper arm. Presently is non-responsive, resps are shallow and irregular. No s/sx of pain or other discomfort. FLACC score is 0/10. Pt is in bed with eyes open but not able to track or verbalize and does not respond to noxious stimuli. Discharge plan os for pt to remain at [facility] until her demise unless her condition warrants a change in level of care. 0840: Pt without heart or lung sounds. No pulse palpable. Attempted to call son at daughter but both phones busy. 0900: Attempted to call family. No answer. 0930: Family phones still busy. 1015: received a new number from [facility] and . Daughter Pushpa Mayor notified of pt passing. Caitlyn crying and hung up the phone but on her way here. 1100: Family arrived to the room.  and SW notified. 1147: Family leaving. [employer] notified. 1257: Body released to [employer].

## 2021-12-22 NOTE — PROGRESS NOTES
Problem: Pressure Injury - Risk of  Goal: *Prevention of pressure injury  Description: Patient Mendel Catheryn Post will remain free from acquired pressure injuries AEB zero acquired pressure injuries during assessment of skin each shift during inpatient hospice stay. Outcome: Progressing Towards Goal  Note: Pressure Injury Interventions:  Sensory Interventions: Assess changes in LOC,Check visual cues for pain    Moisture Interventions: Absorbent underpads,Apply protective barrier, creams and emollients    Activity Interventions: Pressure redistribution bed/mattress(bed type)    Mobility Interventions: Float heels,Pressure redistribution bed/mattress (bed type)    Nutrition Interventions: Document food/fluid/supplement intake    Friction and Shear Interventions: Apply protective barrier, creams and emollients,Lift sheet                Problem: Pain  Goal: Assess satisfaction of level of comfort and symptom control  Description: Patient Mendel Catheryn Post will exhibit decrease in pain AEB rating pain less than 3 on 1-10 scale or 3 FLACC score within each shift of receiving pain medication during inpatient hospice stay. Outcome: Progressing Towards Goal     Problem: Anxiety/Agitation  Goal: Verbalize or staff assess the ability to manage anxiety  Description: Patient Mendel Catheryn Post will demonstrate appropriate motor behavior AEB less than 2 episodes of fidgety, picking or pulling at clothes on devices, yelling out, getting out of bed, etc. each shift during inpatient hospice stay. Outcome: Progressing Towards Goal     Problem: Infection - Risk of, Urinary Catheter-Associated Urinary Tract Infection  Goal: *Absence of infection signs and symptoms  Description: Patient Mendel Catheryn Post will remain free from infection R/T urinary catheter AEB absence of signs and symptoms of infection each shift during inpatient hospice stay.     Outcome: Progressing Towards Goal     Problem: Dyspnea Due to End of Life  Goal: Demonstrate understanding of and ability to manage respiratory symptoms at end of life  Description: Patient Mendel Catheryn Post will indicate effective breathing pattern AEB absence of respiratory distress each shift during inpatient hospice stay. Outcome: Progressing Towards Goal     Problem: Falls - Risk of  Goal: *Absence of Falls  Description: Patient Mendel Catheryn Post will remain free from falls AEB no injuries r/t falls each shift during inpatient hospice stay.     Outcome: Progressing Towards Goal  Note: Fall Risk Interventions:  Mobility Interventions: Bed/chair exit alarm    Mentation Interventions: Bed/chair exit alarm,Door open when patient unattended    Medication Interventions: Bed/chair exit alarm    Elimination Interventions: Bed/chair exit alarm,Call light in reach

## 2021-12-22 NOTE — PROGRESS NOTES
provided spiritual and emotional support following patient's death. Family very emotional  asked our Bereavement Coordinator to join.  introduced [facility] and she offered a prayer. Daughter who was deaf did not want to see her mom.  signed to her offering emotional support and assurance of prayer. Bereavement Risk : Elevated.

## 2021-12-22 NOTE — PROGRESS NOTES
1845:  Patient report from Porter Alvarado RN. Patient ID by name and . Patient is Routine LOC for hospice diagnosis of metabolic encephalopathy. Discharge plan is for patient to remain in [facility] until demise. Discharge plans to be evaluated for potential LOC change. RN reviewed POC with CNA. Patient in bed with eyes closed. No s/sx of pain, dyspnea, or agitation observed. FLACC 0/10. Bed low and locked and all safety measures in place. 2020:  Old Fentanyl patch removed and wasted with Elicia Dougherty RN. New Fentanyl patch applied to left upper arm as ordered. Two family members in room with patient at this time. 0000:  Patient condition remains unchanged. Eyes and mouth open but no response to care. No s/sx of pain, dyspnea, or agitation observed. 0222:  Patient with labored breathing noted; increased RR. Medicated with PRN Morphine 2 mg SQ for dyspnea. Will reassess. Eye drops administered. 0300:  Reassessment:  Patient with irregular breathing; labored at times. Patient appears to be in no distress. 4053:  Patient with increased RR. Medicated with PRN Morphine 2 mg SQ for dyspnea. Will have first shift reassess. Patient received two PRN doses of Morphine SQ for dyspnea this shift. Report to Porter Alvarado RN.

## 2022-07-23 NOTE — [facility] IDG NURSE NOTES
Patient: Florentino Duong    Date: 12/13/21  Time: 10:36 AM    [facility] Nurse Notes  Follow up IDG: Pt is a 62y.o. year-old female with metabolic encephalopathy who is here GIP level of care for management of pain and agitation. Melo with UOP of 325ml   IV access: N/A   PO intake:NPO  Oxygen: Room air  Wounds:  None  PRN medications: Morphine 2mg x 2 doses for pain / Haldol 4mg x 1 dose for agitation / Geodon 5mg x 3 doses for agitation. Scheduled meds:    Current Facility-Administered Medications   Medication Dose Route Frequency    sodium chloride (NS) flush 3 mL  3 mL IntraVENous BID    LORazepam (ATIVAN) injection 1 mg  1 mg IntraVENous Q6H    haloperidol lactate (HALDOL) injection 4 mg  4 mg IntraVENous Q8H    Or    haloperidol (HALDOL) 2 mg/mL oral solution 5 mg  5 mg SubLINGual Q8H     Plan: Change Haldol to 5mg IVQ 6 hours. Increase Geodon to 10mg IM Q 6 hours PRN. Change Haldol to 5mg IV Q 2 hours PRN. Comprehensive plan of care reviewed. IDG and pt./family in agreement with plan of care. The IDG identifies through on-going assessment when a change is needed to the POC; the pt/family will receive care and services necessitated by changes in POC. Medications reviewed by the pharmacist and Medical Director.         Signed by: Gem Chavira RN
Patient: Maximus Hair    Date: 21  Time: 11:05 PM    [facility] Nurse Notes  Madisyn Cutler, 62 y.o. F. Arrived from [facility] to room 101 at the [facility]. Pt medicated prior to transfer with Morphine 2 mg IVP. Pt admitted with DX: Sepsis and aspiration pneumonia. LOC: GIP. Pt is a DNR. Pt transferred to bed. Pt repositioned. Pt with eyes opened. Non verbal. No facial grimace. Flacc =0-1. Resp irreg non labored on 2 L n/c. Lungs with bilateral rales. HR irreg. BS active. No edema noted at this time. Meol cath draining orange clear urine. BLE with contractures. Allevyn dressings no rachel  Areas and back for protection. Peripheral line flushed with 3 ml ns. Flushed well. Dressing clean/dry/intact. Admission complete and initial General Hospice care initiated which includes spiritual,psychosocial, bereavement, Md, and IDG team. IDG team made aware of plan of care and immediate needs. Pt is under [facility] level of care. Will continue to assess need for change of level of care. Collaborate with IDG team regarding discharge planning. Pt is expected to  under [facility] level of care. Reviewed care plan with CNA.            Signed by: Jaspreet Medina RN
Patient: Selam Zapata    Date: 12/16/21  Time: 11:12 AM    [facility] Nurse Notes  Follow Up  IDG: Pt is a 62y.o. year-old female with Metabolic Encephalopathy who is here GIP level of care for management of pain and agitation. Melo with UOP of 450ml   IV access: N/A   PO intake:NPO  Oxygen: Room air  Wounds:  None  PRN medications: Haldol 5mg IV x 1 dose for agitaiton / Ativan 1mg IV x 1 dose for agitation / Morphine 2mg x 1 dose IV for pain / Geodon 10mg IV x 1 dose for agitation. Scheduled meds:    Current Facility-Administered Medications   Medication Dose Route Frequency    ziprasidone (GEODON) 10 mg in sterile water (preservative free) 0.5 mL injection  10 mg IntraMUSCular Q12H    fentaNYL (DURAGESIC) 12 mcg/hr patch 1 Patch  1 Patch TransDERmal Q72H    haloperidol lactate (HALDOL) injection 5 mg  5 mg SubCUTAneous Q6H     Plan:   Comprehensive plan of care reviewed. IDG and pt./family in agreement with plan of care. The IDG identifies through on-going assessment when a change is needed to the POC; the pt/family will receive care and services necessitated by changes in POC. Medications reviewed by the pharmacist and Medical Director.         Signed by: Devan Willett RN
0

## 2023-03-20 NOTE — PROGRESS NOTES
[id number] [place] [id number]. Pt name and  identified. Pt in bed, resting with eyes closed. No signs or symptoms of pain, shortness of breath, anxiety , seizures or nausea/vomiting. FLACC 0/10. Comfort and safety measures in place. HOB elevated 30 degrees. Side rails up x2. Bed low and locked. Bed alarm tab in place. Call light in reach of patient, Door open for monitored visualization and hearing of patient. Care Plan reviewed and collaboration done with CNA. Pt expected to pass under GIP however will continue to assess change in LOC, medication & comfort needs, while collaborating with the Interdisciplinary Team.    3101: Pt given Geodon 10 mg IM right thigh for increased moaning and agitation. Pt floated on pillows. Respirations even and shallow. RR 16. Comfort and safety measures in place. Exit Tab in place on pt gown. 1156: Scheduled Meds given per MAR. Pt remains restful, respirations even and nonlabored. RR 16 with open mouth breathing, Mouth care done. Pt staring up at the ceiling. No facial grimacing noted. FLACC 0/10. No additional sx's of pain, Dyspnea, Agitation or Nausea  to manage at this time. 1330: Pt observed on rounds, sleeping with eyes closed . Respirations even and nonlabored. No facial grimacing noted. No moaning or agitation. Flacc 0/10. No additional symptoms to manage at this time. Comfort and Safety measures maintained. Alarm exit tab in place. 1530: -Patient continues resting quietly in bed with eyes closed. Respirations unlabored with no signs or symptoms of distress, pain, agitation, or discomfort noted. FLACC 0.    1625: Patient heard moaning . Lying still staring at the ceiling, no bodily mvt noted. Pt reposiotned onto her opposite side, Right side. Medicated with PRN Geodon IM for agitation. Comfort and safety measures maintained. Tab Alert in place. Mouth care done. 1700: Patient resting quietly in bed with eyes closed.  Respirations unlabored with Pt was last seen on 2/9/2023.      no signs or symptoms of distress, pain, agitation, or discomfort noted. FLACC 0.    1815: Pt observed on rounds, sleeping with eyes closed . Respirations even and nonlabored. No facial grimacing noted. No moaning or agitation. Flacc 0/10. No additional symptoms to manage at this time. Comfort and Safety measures maintained. Alarm exit tab in place.     Shift Report given to Krista Delgadillo RN

## 2023-07-10 NOTE — PROGRESS NOTES
1345 Bedside Report taken from [facility]. Pt identified. Pt in bed with eyes closed; displaying no signs or symptoms of pain, dyspnea, agitation,seizures, nausea, or vomiting. FLACC 0. Bed locked and low, side rails up, tabs/bed alarm in place for pt. safety. Call light with in reach, and door opened for continued monitoring. Care plan reviewed with Cna.     5940 Pt given scheduled medications and iv flushed. Pt observed and appears to be sleeping/resting, no facial grimacing, no moaning, easy relaxed respirations. No symptoms to manage at this time. Flacc 0/10    1 Pt son and daughter in law in room had several questions. I swabbed pt mouth with thicken water. I did attempt to see if pt could swallow some thicken water but she was not able. He had been sitting with his phone angled towards her mouth. He stated he thought she had thrush and requested medication for it. I explained to him her inability to swallow but we could talk to the doc about swabbing her mouth with some nystatin or magic mouth wash if available. Caitlyn daughter came in shortly after and she was updated as well. 1758 Scheduled medication given. Pt was moaning, yelling, and grimacing. Daughter had came to request medication as I was pulling it up.  Flacc 6/10 Cyclophosphamide Counseling:  I discussed with the patient the risks of cyclophosphamide including but not limited to hair loss, hormonal abnormalities, decreased fertility, abdominal pain, diarrhea, nausea and vomiting, bone marrow suppression and infection. The patient understands that monitoring is required while taking this medication.